# Patient Record
Sex: MALE | Race: WHITE | NOT HISPANIC OR LATINO | Employment: PART TIME | URBAN - METROPOLITAN AREA
[De-identification: names, ages, dates, MRNs, and addresses within clinical notes are randomized per-mention and may not be internally consistent; named-entity substitution may affect disease eponyms.]

---

## 2021-07-13 ENCOUNTER — OFFICE VISIT (OUTPATIENT)
Dept: INTERNAL MEDICINE | Facility: CLINIC | Age: 21
End: 2021-07-13

## 2021-07-13 VITALS
DIASTOLIC BLOOD PRESSURE: 74 MMHG | OXYGEN SATURATION: 98 % | BODY MASS INDEX: 25.97 KG/M2 | HEIGHT: 70 IN | WEIGHT: 181.4 LBS | HEART RATE: 75 BPM | TEMPERATURE: 98.2 F | SYSTOLIC BLOOD PRESSURE: 126 MMHG

## 2021-07-13 DIAGNOSIS — J45.20 MILD INTERMITTENT ASTHMA WITHOUT COMPLICATION: Primary | ICD-10-CM

## 2021-07-13 DIAGNOSIS — H92.02 EAR DISCOMFORT, LEFT: ICD-10-CM

## 2021-07-13 DIAGNOSIS — J30.2 SEASONAL ALLERGIES: ICD-10-CM

## 2021-07-13 PROCEDURE — 99204 OFFICE O/P NEW MOD 45 MIN: CPT | Performed by: FAMILY MEDICINE

## 2021-07-13 RX ORDER — MONTELUKAST SODIUM 10 MG/1
10 TABLET ORAL NIGHTLY
Qty: 90 TABLET | Refills: 1 | Status: SHIPPED | OUTPATIENT
Start: 2021-07-13 | End: 2021-09-14

## 2021-07-13 RX ORDER — LORATADINE 10 MG/1
10 TABLET ORAL DAILY
Qty: 90 TABLET | Refills: 1 | Status: SHIPPED | OUTPATIENT
Start: 2021-07-13 | End: 2021-09-14

## 2021-07-13 NOTE — PROGRESS NOTES
Subjective     Konrad Aquino is a 20 y.o. male.     Chief Complaint   Patient presents with   • Establish Care   • Earache   • Asthma   • Allergies       History of Present Illness     Lt ear discomfort for > 1 year , on/off , feels as if fluid in it , no F,C , no discharge     Asthma since childhood , using Albu PRN , weather changes makes his sx worse , c/o on/off dry cough with wheezing.    Seasonal allergies noticed sx worse with animal exposure , c/o on/off congestion , runny nose , cough , no F,C   He had bad reaction to flonase when he used it , any thing with steroid cause him bad reaction.    The following portions of the patient's history were reviewed and updated as appropriate: allergies, current medications, past family history, past medical history, past social history, past surgical history and problem list.        Review of Systems   Constitutional: Negative for activity change and appetite change.   HENT: Positive for congestion and ear pain. Negative for ear discharge.    Respiratory: Positive for cough. Negative for shortness of breath, wheezing and stridor.    All other systems reviewed and are negative.      Vitals:    07/13/21 0911   BP: 126/74   Pulse: 75   Temp: 98.2 °F (36.8 °C)   SpO2: 98%           07/13/21 0911   Weight: 82.3 kg (181 lb 6.4 oz)         Body mass index is 26.03 kg/m².      Current Outpatient Medications   Medication Sig Dispense Refill   • loratadine (Claritin) 10 MG tablet Take 1 tablet by mouth Daily. 90 tablet 1   • montelukast (Singulair) 10 MG tablet Take 1 tablet by mouth Every Night. 90 tablet 1     No current facility-administered medications for this visit.                Objective   Physical Exam  Vitals and nursing note reviewed.   Constitutional:       Appearance: He is well-developed. He is not ill-appearing or diaphoretic.   HENT:      Head: Normocephalic.      Right Ear: Tympanic membrane, ear canal and external ear normal.      Left Ear: Tympanic  membrane, ear canal and external ear normal.      Mouth/Throat:      Mouth: Mucous membranes are moist.      Pharynx: Oropharynx is clear.   Eyes:      Conjunctiva/sclera: Conjunctivae normal.   Neck:      Thyroid: No thyromegaly.   Cardiovascular:      Rate and Rhythm: Normal rate and regular rhythm.      Heart sounds: Normal heart sounds. No murmur heard.   No friction rub. No gallop.    Pulmonary:      Effort: Pulmonary effort is normal. No respiratory distress.      Breath sounds: Normal breath sounds. No stridor. No wheezing, rhonchi or rales.   Musculoskeletal:         General: Normal range of motion.      Cervical back: Neck supple.   Skin:     General: Skin is warm.      Findings: No rash.   Neurological:      Mental Status: He is alert and oriented to person, place, and time.      Motor: No abnormal muscle tone.   Psychiatric:         Mood and Affect: Mood normal.         Behavior: Behavior normal.         Thought Content: Thought content normal.         Judgment: Judgment normal.           Assessment/Plan   Diagnoses and all orders for this visit:    1. Mild intermittent asthma without complication (Primary);  - Continue Alb PRN  -     montelukast (Singulair) 10 MG tablet; Take 1 tablet by mouth Every Night.  Dispense: 90 tablet; Refill: 1  - Advised to avoid any triggers     2. Seasonal allergies  -     loratadine (Claritin) 10 MG tablet; Take 1 tablet by mouth Daily.  Dispense: 90 tablet; Refill: 1    3. Ear discomfort, left;  - Most likely due to allergies   - Close monitoring and f/u        I have fully discussed the nature of the medical condition(s) risks, complications, management, safe and proper use of medications.   I have discussed the SIDE EFFECT OF MEDICATION and importance TO report any side effect , the patient expressed good understanding.  Encouraged medication compliance and the importance of keeping scheduled follow up appointments with me and any other providers.    Patient instructed  to follow up with our office for results on any labs/imaging ordered during this visit.    Home care discussed  All questions answered  Patient verbalizes understanding and agrees to treatment plan.     Follow up: Return in about 1 month (around 8/13/2021) for follow up on current illness.

## 2021-07-13 NOTE — PATIENT INSTRUCTIONS
Allergic Rhinitis, Adult  Allergic rhinitis is a reaction to allergens. Allergens are things that can cause an allergic reaction. This condition affects the lining inside the nose (mucous membrane).  There are two types of allergic rhinitis:  · Seasonal. This type is also called hay fever. It happens only during some times of the year.  · Perennial. This type can happen at any time of the year.  This condition cannot be spread from person to person (is not contagious). It can be mild, worse, or very bad. It can develop at any age and may be outgrown.  What are the causes?  This condition may be caused by:  · Pollen from grasses, trees, and weeds.  · Dust mites.  · Smoke.  · Mold.  · Car fumes.  · The pee (urine), spit, or dander of pets. Dander is dead skin cells from a pet.  What increases the risk?  You are more likely to develop this condition if:  · You have allergies in your family.  · You have problems like allergies in your family. You may have:  ? Swelling of parts of your eyes and eyelids.  ? Asthma. This affects how you breathe.  ? Long-term redness and swelling on your skin.  ? Food allergies.  What are the signs or symptoms?  The main symptom of this condition is a runny or stuffy nose (nasal congestion). Other symptoms may include:  · Sneezing or coughing.  · Itching and tearing of your eyes.  · Mucus that drips down the back of your throat (postnasal drip).  · Trouble sleeping.  · Feeling tired.  · Headache.  · Sore throat.  How is this treated?  There is no cure for this condition. You should avoid things that you are allergic to. Treatment can help to relieve symptoms. This may include:  · Medicines that block allergy symptoms, such as corticosteroids or antihistamines. These may be given as a shot, nasal spray, or pill.  · Avoiding things you are allergic to.  · Medicines that give you bits of what you are allergic to over time. This is called immunotherapy. It is done if other treatments do not  help. You may get:  ? Shots.  ? Medicine under your tongue.  · Stronger medicines, if other treatments do not help.  Follow these instructions at home:  Avoiding allergens  Find out what things you are allergic to and avoid them. To do this, try these things:  · If you get allergies any time of year:  ? Replace carpet with wood, tile, or vinyl tru. Carpet can trap pet dander and dust.  ? Do not smoke. Do not allow smoking in your home.  ? Change your heating and air conditioning filters at least once a month.  · If you get allergies only some times of the year:  ? Keep windows closed when you can.  ? Plan things to do outside when pollen counts are lowest. Check pollen counts before you plan things to do outside.  ? When you come indoors, change your clothes and shower before you sit on furniture or bedding.  · If you are allergic to a pet:  ? Keep the pet out of your bedroom.  ? Vacuum, sweep, and dust often.    General instructions  · Take over-the-counter and prescription medicines only as told by your doctor.  · Drink enough fluid to keep your pee (urine) pale yellow.  · Keep all follow-up visits as told by your doctor. This is important.  Where to find more information  · American Academy of Allergy, Asthma & Immunology: www.aaaai.org  Contact a doctor if:  · You have a fever.  · You get a cough that does not go away.  · You make whistling sounds when you breathe (wheeze).  · Your symptoms slow you down.  · Your symptoms stop you from doing your normal things each day.  Get help right away if:  · You are short of breath.  This symptom may be an emergency. Do not wait to see if the symptom will go away. Get medical help right away. Call your local emergency services (911 in the [place] Do not drive yourself to the hospital.  Summary  · Allergic rhinitis may be treated by taking medicines and avoiding things you are allergic to.  · If you have allergies only some of the year, keep windows closed when you can  at those times.  · Contact your doctor if you get a fever or a cough that does not go away.  This information is not intended to replace advice given to you by your health care provider. Make sure you discuss any questions you have with your health care provider.  Document Revised: 02/08/2021 Document Reviewed: 12/15/2020  Elsevier Patient Education © 2021 [employer]

## 2021-09-14 ENCOUNTER — OFFICE VISIT (OUTPATIENT)
Dept: INTERNAL MEDICINE | Facility: CLINIC | Age: 21
End: 2021-09-14

## 2021-09-14 ENCOUNTER — HOSPITAL ENCOUNTER (OUTPATIENT)
Dept: GENERAL RADIOLOGY | Facility: HOSPITAL | Age: 21
Discharge: HOME | End: 2021-09-14
Admitting: FAMILY MEDICINE

## 2021-09-14 VITALS
DIASTOLIC BLOOD PRESSURE: 78 MMHG | SYSTOLIC BLOOD PRESSURE: 116 MMHG | WEIGHT: 187.4 LBS | HEART RATE: 73 BPM | TEMPERATURE: 97.5 F | BODY MASS INDEX: 26.83 KG/M2 | HEIGHT: 70 IN | OXYGEN SATURATION: 98 %

## 2021-09-14 DIAGNOSIS — M25.571 CHRONIC PAIN OF RIGHT ANKLE: ICD-10-CM

## 2021-09-14 DIAGNOSIS — G89.29 CHRONIC PAIN OF RIGHT ANKLE: ICD-10-CM

## 2021-09-14 DIAGNOSIS — J45.20 MILD INTERMITTENT ASTHMA WITHOUT COMPLICATION: Primary | ICD-10-CM

## 2021-09-14 DIAGNOSIS — J30.2 SEASONAL ALLERGIES: ICD-10-CM

## 2021-09-14 DIAGNOSIS — L98.9 SKIN LESION: ICD-10-CM

## 2021-09-14 PROCEDURE — 99213 OFFICE O/P EST LOW 20 MIN: CPT | Performed by: FAMILY MEDICINE

## 2021-09-14 PROCEDURE — 73610 X-RAY EXAM OF ANKLE: CPT

## 2021-09-14 RX ORDER — ALBUTEROL SULFATE 90 UG/1
2 AEROSOL, METERED RESPIRATORY (INHALATION) EVERY 4 HOURS PRN
Qty: 18 G | Refills: 3 | Status: SHIPPED | OUTPATIENT
Start: 2021-09-14 | End: 2022-10-03 | Stop reason: SDUPTHER

## 2021-09-14 NOTE — PROGRESS NOTES
Subjective     Konrad Aquino is a 21 y.o. male.     Chief Complaint   Patient presents with   • Allergies     f/u   • Asthma     stated he needs and inhaler for when symptoms occur    • Ankle Pain   • Rash       History of Present Illness     Seasonal allergies ; sx improved with current Allergy medication specially when he is going out side. Dose not like Singulair SO HE STOPPED TAKING IT     Asthma;No new flare up or new visit to ER, using Albu as needed.  Sx worse when he is around strong spray ,dust makes his sx worse     Rt ankle pain for > 1 year , pain constant mainly over the medial malleolus , 5/10 , worse after long standing hrs. No h/o fall or trauma     Skin lesion on the RT shoulder for > 6 months , gets smaller , not itchy , was red, changed to dark color for last 4 months     The following portions of the patient's history were reviewed and updated as appropriate: allergies, current medications, past family history, past medical history, past social history, past surgical history and problem list.        Review of Systems   Musculoskeletal: Positive for arthralgias.   Skin: Positive for color change and skin lesions.       Vitals:    09/14/21 0930   BP: 116/78   Pulse: 73   Temp: 97.5 °F (36.4 °C)   SpO2: 98%           09/14/21 0930   Weight: 85 kg (187 lb 6.4 oz)         Body mass index is 26.89 kg/m².      Current Outpatient Medications   Medication Sig Dispense Refill   • albuterol sulfate  (90 Base) MCG/ACT inhaler Inhale 2 puffs Every 4 (Four) Hours As Needed for Wheezing. 18 g 3     No current facility-administered medications for this visit.                Objective   Physical Exam  Vitals and nursing note reviewed.   Constitutional:       Appearance: He is not ill-appearing or diaphoretic.   HENT:      Mouth/Throat:      Mouth: Mucous membranes are moist.      Pharynx: Oropharynx is clear.   Cardiovascular:      Rate and Rhythm: Normal rate and regular rhythm.      Heart sounds:  Normal heart sounds. No murmur heard.   No friction rub. No gallop.    Pulmonary:      Effort: Pulmonary effort is normal. No respiratory distress.      Breath sounds: Normal breath sounds. No stridor. No wheezing or rhonchi.   Musculoskeletal:         General: No swelling, tenderness or deformity. Normal range of motion.      Cervical back: Neck supple.   Skin:     General: Skin is warm.      Findings: Lesion present.      Comments: Small brown papules over the Rt shoulder    Neurological:      Mental Status: He is alert and oriented to person, place, and time.   Psychiatric:         Mood and Affect: Mood normal.         Behavior: Behavior normal.         Thought Content: Thought content normal.           Assessment/Plan   Diagnoses and all orders for this visit:    1. Mild intermittent asthma without complication (Primary);  - Well controlled, continue Rx  -     albuterol sulfate  (90 Base) MCG/ACT inhaler; Inhale 2 puffs Every 4 (Four) Hours As Needed for Wheezing.  Dispense: 18 g; Refill: 3    2. Seasonal allergies  - Well controlled, continue Rx PRN    3. Chronic pain of right ankle;  - To r/o stress # or any other path  -     XR Ankle 3+ View Right; Future    4. Skin lesion;  - Close monitoring and f/u          I have fully discussed the nature of the medical condition(s) risks, complications, management, safe and proper use of medications.   I have discussed the SIDE EFFECT OF MEDICATION and importance TO report any side effect , the patient expressed good understanding.  Encouraged medication compliance and the importance of keeping scheduled follow up appointments with me and any other providers.    Patient instructed to follow up with our office for results on any labs/imaging ordered during this visit.    Home care discussed  All questions answered  Patient verbalizes understanding and agrees to treatment plan.     Follow up: Return in about 3 months (around 12/14/2021) for follow up on current  illness.

## 2021-09-14 NOTE — PATIENT INSTRUCTIONS
Asthma Action Plan, Adult  An asthma action plan helps you understand how to manage your asthma and what to do when you have an asthma attack. The action plan is a color-coded plan that lists the symptoms that indicate whether or not your condition is under control and what actions to take.  · If you have symptoms in the green zone, it means you are doing well.  · If you have symptoms in the yellow zone, it means you are having problems.  · If you have symptoms in the red zone, you need medical care right away.  Follow the plan that you and your health care provider develop. Review your plan with your health care provider at each visit.  What triggers your asthma?  Knowing the things that can trigger an asthma attack or make your asthma symptoms worse is very important. Talk to your health care provider about your asthma triggers and how to avoid them. Record your known asthma triggers here: _______________  What is your personal best peak flow reading?  If you use a peak flow meter, determine your personal best reading. Record it here: _______________  Red zone  Symptoms in this zone mean that you should get medical help right away. You will likely feel distressed and have symptoms at rest that restrict your activity. You are in the red zone if:  · You are breathing hard and quickly.  · Your nose opens wide, your ribs show, and your neck muscles become visible when you breathe in.  · Your lips, fingers, or toes are a bluish color.  · You have trouble speaking in full sentences.  · Your peak flow reading is less than __________ (less than 50% of your personal best).  · Your symptoms do not improve within 15-20 minutes after you use your reliever or rescue medicine (bronchodilator).  If you have any of these symptoms:  · Call your local emergency services (911 in the [place] or go to the nearest emergency room.  · Use your reliever or rescue medicine.  ? Start a nebulizer treatment or take 2-4 puffs from a metered-dose  inhaler with a spacer.  ? Repeat this action every 15-20 minutes until help arrives.  Yellow zone  Symptoms in this zone mean that your condition may be getting worse. You may have symptoms that interfere with exercise, are noticeably worse after exposure to triggers, or are worse at the first sign of a cold (upper respiratory infection). These may include:  · Waking from sleep.  · Coughing, especially at night or first thing in the morning.  · Mild wheezing.  · Chest tightness.  · A peak flow reading that is __________ to __________ (50-79% of your personal best).  If you have any of these symptoms:  · Add the following medicine to the ones that you use daily:  ? Reliever or rescue medicine and dosage: _______________  ? Additional medicine and dosage: _______________  Call your health care provider if:  · You remain in the yellow zone for __________ hours.  · You are using a reliever or rescue medicine more than 2-3 times a week.  Green zone  This zone means that your asthma is under control. You may not have any symptoms while you are in the green zone. This means that you:  · Have no coughing or wheezing, even while you are working or playing.  · Sleep through the night.  · Are breathing well.  · Have a peak flow reading that is above __________ (80% of your personal best or greater).  If you are in the green zone, continue to manage your asthma as directed:  · Take these medicines every day:  ? Controller medicine and dosage: _______________  ? Controller medicine and dosage: _______________  ? Controller medicine and dosage: _______________  ? Controller medicine and dosage: _______________  · Before exercise, use this reliever or rescue medicine: _______________  Call your health care provider if you are using a reliever or rescue medicine more than 2-3 times a week.  Where to find more information  You can find more information about asthma from:  · Centers for Disease Control and Prevention:  www.cdc.gov/asthma  · American Lung Association: www.lung.org  This information is not intended to replace advice given to you by your health care provider. Make sure you discuss any questions you have with your health care provider.  Document Revised: 04/13/2021 Document Reviewed: 04/13/2021  [employer] Patient Education © 2021 [employer]

## 2021-09-17 ENCOUNTER — TELEPHONE (OUTPATIENT)
Dept: INTERNAL MEDICINE | Facility: CLINIC | Age: 21
End: 2021-09-17

## 2021-09-17 NOTE — TELEPHONE ENCOUNTER
----- Message from Scotty Richey MD sent at 9/16/2021  5:24 PM EDT -----  PLEASE call for normal XR results

## 2021-11-19 PROCEDURE — U0004 COV-19 TEST NON-CDC HGH THRU: HCPCS | Performed by: FAMILY MEDICINE

## 2022-01-11 NOTE — TELEPHONE ENCOUNTER
Rx Refill Note  Requested Prescriptions      No prescriptions requested or ordered in this encounter      Last office visit with prescribing clinician: 9/14/2021      Next office visit with prescribing clinician: Visit date not found     Yaquelin Shen MA  01/11/22, 11:04 EST     Pt requested refill for montelukast but prescription was discontinued 9/14/21.

## 2022-01-27 ENCOUNTER — OFFICE VISIT (OUTPATIENT)
Dept: INTERNAL MEDICINE | Facility: CLINIC | Age: 22
End: 2022-01-27

## 2022-01-27 VITALS
OXYGEN SATURATION: 96 % | HEART RATE: 80 BPM | SYSTOLIC BLOOD PRESSURE: 110 MMHG | DIASTOLIC BLOOD PRESSURE: 72 MMHG | HEIGHT: 71 IN | WEIGHT: 198.6 LBS | TEMPERATURE: 96.9 F | BODY MASS INDEX: 27.8 KG/M2

## 2022-01-27 DIAGNOSIS — J30.2 SEASONAL ALLERGIES: ICD-10-CM

## 2022-01-27 DIAGNOSIS — R21 RASH: Primary | ICD-10-CM

## 2022-01-27 DIAGNOSIS — R20.0 NUMBNESS AND TINGLING OF BOTH FEET: ICD-10-CM

## 2022-01-27 DIAGNOSIS — R20.2 NUMBNESS AND TINGLING OF BOTH FEET: ICD-10-CM

## 2022-01-27 PROCEDURE — 99214 OFFICE O/P EST MOD 30 MIN: CPT | Performed by: FAMILY MEDICINE

## 2022-01-27 NOTE — PROGRESS NOTES
Subjective     Konrad Aquino is a 21 y.o. male.     Chief Complaint   Patient presents with   • Rash   • Numbness     FOOT NUMBNESS        History of Present Illness     C/o Rash for > 2 months at bot butt area ,  on/off, itchy, some times gets blisters , it last for 2 weeks then resolved by itself. No new detergent/chemicals or others. He is concerning for shingle   He is wearied feeling when he takes Flonase or steroid INH but ok with topical creams    Also c/o Numbness of both feet/sole for > 1 year , constant , he dose not feel that area much, he has Flat feet . Used OTC topicals with no better.    Seasonal allergies ; sx on /off with runny nose, sneezing , sx well controlled with current Rx    The following portions of the patient's history were reviewed and updated as appropriate: allergies, current medications, past family history, past medical history, past social history, past surgical history and problem list.        Review of Systems   Cardiovascular: Negative for chest pain, palpitations and leg swelling.   Skin: Positive for rash.   Neurological: Positive for numbness.       Vitals:    01/27/22 0924   BP: 110/72   Pulse: 80   Temp: 96.9 °F (36.1 °C)   SpO2: 96%           01/27/22 0924   Weight: 90.1 kg (198 lb 9.6 oz)         Body mass index is 27.71 kg/m².      Current Outpatient Medications   Medication Sig Dispense Refill   • albuterol sulfate  (90 Base) MCG/ACT inhaler Inhale 2 puffs Every 4 (Four) Hours As Needed for Wheezing. 18 g 3   • loratadine (CLARITIN) 10 MG tablet Take 10 mg by mouth Daily.     • ondansetron (ZOFRAN) 4 MG tablet Take 1 tablet by mouth Every 8 (Eight) Hours As Needed for Nausea. 9 tablet 0   • triamcinolone (KENALOG) 0.1 % ointment Apply 1 application topically to the appropriate area as directed 2 (Two) Times a Day. 30 g 2     No current facility-administered medications for this visit.                Objective   Physical Exam  Vitals and nursing note reviewed.    Constitutional:       General: He is not in acute distress.     Appearance: He is not ill-appearing, toxic-appearing or diaphoretic.   HENT:      Mouth/Throat:      Mouth: Mucous membranes are moist.   Cardiovascular:      Rate and Rhythm: Normal rate and regular rhythm.      Heart sounds: Normal heart sounds. No murmur heard.      Pulmonary:      Effort: Pulmonary effort is normal. No respiratory distress.      Breath sounds: Normal breath sounds. No stridor. No wheezing or rhonchi.   Musculoskeletal:         General: Normal range of motion.   Skin:     General: Skin is dry.      Findings: Rash present.      Comments: Mild irritative rash over the top of both butt area    Neurological:      Mental Status: He is alert and oriented to person, place, and time.      Comments: diminish sensation on both feet at the mid of the sole    Psychiatric:         Mood and Affect: Mood normal.         Behavior: Behavior normal.         Thought Content: Thought content normal.           Assessment/Plan   Diagnoses and all orders for this visit:    1. Rash (Primary);  - Likely contact dermitis , will try;  -     triamcinolone (KENALOG) 0.1 % ointment; Apply 1 application topically to the appropriate area as directed 2 (Two) Times a Day.  Dispense: 30 g; Refill: 2  - Advised to avoid any new chemicals/creams/detregent     2. Numbness and tingling of both feet;  -     Ambulatory Referral to Podiatry    3. Seasonal allergies  - Stable, continue current Rx     I was wearing N95 mask and eye protection during the entire duration of the visit.   Patient was masked the whole entire time.   Minimum social distance of 6 ft maintained through entire visit except for physical exam as documented.          I have fully discussed the nature of the medical condition(s) risks, complications, management, safe and proper use of medications.   I have discussed the SIDE EFFECT OF MEDICATION and importance TO report any side effect , the patient  expressed good understanding.  Encouraged medication compliance and the importance of keeping scheduled follow up appointments with me and any other providers.    Patient instructed to follow up with our office for results on any labs/imaging ordered during this visit.    Home care discussed  All questions answered  Patient verbalizes understanding and agrees to treatment plan.     Follow up: Return in about 8 weeks (around 3/24/2022) for physical, blood work.

## 2022-02-15 DIAGNOSIS — R20.0 NUMBNESS AND TINGLING OF BOTH FEET: Primary | ICD-10-CM

## 2022-02-15 DIAGNOSIS — R20.2 NUMBNESS AND TINGLING OF BOTH FEET: Primary | ICD-10-CM

## 2022-04-07 ENCOUNTER — LAB (OUTPATIENT)
Dept: LAB | Facility: HOSPITAL | Age: 22
End: 2022-04-07

## 2022-04-07 ENCOUNTER — OFFICE VISIT (OUTPATIENT)
Dept: INTERNAL MEDICINE | Facility: CLINIC | Age: 22
End: 2022-04-07

## 2022-04-07 VITALS
SYSTOLIC BLOOD PRESSURE: 122 MMHG | BODY MASS INDEX: 27.89 KG/M2 | DIASTOLIC BLOOD PRESSURE: 80 MMHG | HEART RATE: 92 BPM | OXYGEN SATURATION: 97 % | HEIGHT: 71 IN | TEMPERATURE: 97.1 F | WEIGHT: 199.2 LBS

## 2022-04-07 DIAGNOSIS — Z23 NEED FOR PROPHYLACTIC VACCINATION AGAINST STREPTOCOCCUS PNEUMONIAE (PNEUMOCOCCUS): ICD-10-CM

## 2022-04-07 DIAGNOSIS — Z00.00 ENCOUNTER FOR ANNUAL PHYSICAL EXAM: Primary | ICD-10-CM

## 2022-04-07 DIAGNOSIS — J45.20 MILD INTERMITTENT ASTHMA WITHOUT COMPLICATION: ICD-10-CM

## 2022-04-07 DIAGNOSIS — Z00.00 ENCOUNTER FOR ANNUAL PHYSICAL EXAM: ICD-10-CM

## 2022-04-07 LAB
ALBUMIN SERPL-MCNC: 5.2 G/DL (ref 3.5–5.2)
ALBUMIN/GLOB SERPL: 2.2 G/DL
ALP SERPL-CCNC: 123 U/L (ref 39–117)
ALT SERPL W P-5'-P-CCNC: 26 U/L (ref 1–41)
ANION GAP SERPL CALCULATED.3IONS-SCNC: 8.8 MMOL/L (ref 5–15)
AST SERPL-CCNC: 22 U/L (ref 1–40)
BILIRUB BLD-MCNC: NEGATIVE MG/DL
BILIRUB SERPL-MCNC: 0.8 MG/DL (ref 0–1.2)
BUN SERPL-MCNC: 12 MG/DL (ref 6–20)
BUN/CREAT SERPL: 17.9 (ref 7–25)
CALCIUM SPEC-SCNC: 9.7 MG/DL (ref 8.6–10.5)
CHLORIDE SERPL-SCNC: 104 MMOL/L (ref 98–107)
CLARITY, POC: CLEAR
CO2 SERPL-SCNC: 26.2 MMOL/L (ref 22–29)
COLOR UR: ABNORMAL
CREAT SERPL-MCNC: 0.67 MG/DL (ref 0.76–1.27)
DEPRECATED RDW RBC AUTO: 39.9 FL (ref 37–54)
EGFRCR SERPLBLD CKD-EPI 2021: 135.4 ML/MIN/1.73
ERYTHROCYTE [DISTWIDTH] IN BLOOD BY AUTOMATED COUNT: 12.9 % (ref 12.3–15.4)
EXPIRATION DATE: ABNORMAL
GLOBULIN UR ELPH-MCNC: 2.4 GM/DL
GLUCOSE SERPL-MCNC: 95 MG/DL (ref 65–99)
GLUCOSE UR STRIP-MCNC: NEGATIVE MG/DL
HCT VFR BLD AUTO: 47 % (ref 37.5–51)
HGB BLD-MCNC: 15.7 G/DL (ref 13–17.7)
KETONES UR QL: NEGATIVE
LEUKOCYTE EST, POC: NEGATIVE
Lab: ABNORMAL
MCH RBC QN AUTO: 28.9 PG (ref 26.6–33)
MCHC RBC AUTO-ENTMCNC: 33.4 G/DL (ref 31.5–35.7)
MCV RBC AUTO: 86.4 FL (ref 79–97)
NITRITE UR-MCNC: NEGATIVE MG/ML
PH UR: 6.5 [PH] (ref 5–8)
PLATELET # BLD AUTO: 190 10*3/MM3 (ref 140–450)
PMV BLD AUTO: 11.7 FL (ref 6–12)
POTASSIUM SERPL-SCNC: 4 MMOL/L (ref 3.5–5.2)
PROT SERPL-MCNC: 7.6 G/DL (ref 6–8.5)
PROT UR STRIP-MCNC: ABNORMAL MG/DL
RBC # BLD AUTO: 5.44 10*6/MM3 (ref 4.14–5.8)
RBC # UR STRIP: NEGATIVE /UL
SODIUM SERPL-SCNC: 139 MMOL/L (ref 136–145)
SP GR UR: 1.02 (ref 1–1.03)
UROBILINOGEN UR QL: NORMAL
WBC NRBC COR # BLD: 5.9 10*3/MM3 (ref 3.4–10.8)

## 2022-04-07 PROCEDURE — 80053 COMPREHEN METABOLIC PANEL: CPT | Performed by: FAMILY MEDICINE

## 2022-04-07 PROCEDURE — 90732 PPSV23 VACC 2 YRS+ SUBQ/IM: CPT | Performed by: FAMILY MEDICINE

## 2022-04-07 PROCEDURE — 3008F BODY MASS INDEX DOCD: CPT | Performed by: FAMILY MEDICINE

## 2022-04-07 PROCEDURE — 85027 COMPLETE CBC AUTOMATED: CPT | Performed by: FAMILY MEDICINE

## 2022-04-07 PROCEDURE — 2014F MENTAL STATUS ASSESS: CPT | Performed by: FAMILY MEDICINE

## 2022-04-07 PROCEDURE — 81003 URINALYSIS AUTO W/O SCOPE: CPT | Performed by: FAMILY MEDICINE

## 2022-04-07 PROCEDURE — 99395 PREV VISIT EST AGE 18-39: CPT | Performed by: FAMILY MEDICINE

## 2022-04-07 PROCEDURE — 90471 IMMUNIZATION ADMIN: CPT | Performed by: FAMILY MEDICINE

## 2022-04-07 RX ORDER — LORATADINE 10 MG
TABLET ORAL
COMMUNITY
Start: 2022-03-22 | End: 2022-10-03

## 2022-04-07 RX ORDER — KETOCONAZOLE 20 MG/ML
SHAMPOO TOPICAL
COMMUNITY
Start: 2022-03-24 | End: 2022-12-13

## 2022-04-07 RX ORDER — OLOPATADINE HYDROCHLORIDE 1 MG/ML
SOLUTION/ DROPS OPHTHALMIC
COMMUNITY
Start: 2022-03-22

## 2022-04-07 NOTE — PROGRESS NOTES
Patient Care Team:  Scotty Richey MD as PCP - General (Family Medicine)     Chief complaint: Patient is in today for a physical          Patient is a 21 y.o. male who presents for his yearly physical exam.     HPI      Doing well   Eats RD, walks ~ 2 mile /day  Asthma and SA; He saw allergist , allergy test done which showed that he is allergic to many things, started on shots, doing well on current Rx    In 2014 , had Tdap     Health maintenance/lifestyle:  Immunization History   Administered Date(s) Administered   • COVID-19 (PFIZER) PURPLE CAP 03/18/2021, 04/13/2021, 11/23/2021         PHQ-2 Depression Screening  Little interest or pleasure in doing things?  0   Feeling down, depressed, or hopeless?  0   PHQ-2 Total Score  0     Social History     Tobacco Use   Smoking Status Never Smoker   Smokeless Tobacco Never Used     Social History     Substance and Sexual Activity   Alcohol Use Yes   • Alcohol/week: 2.0 standard drinks   • Types: 2 Cans of beer per week         Review of Systems   Constitutional: Negative for activity change and appetite change.   Respiratory: Positive for cough.    Cardiovascular: Negative for chest pain, palpitations and leg swelling.   All other systems reviewed and are negative.        History  Past Medical History:   Diagnosis Date   • Acid reflux    • Asthma       Past Surgical History:   Procedure Laterality Date   • HAND SURGERY     • WISDOM TOOTH EXTRACTION  01/2022      Allergies   Allergen Reactions   • Flonase [Fluticasone] Other (See Comments)     Panic attacks   • Latex Rash   • Penicillins Rash      Family History   Problem Relation Age of Onset   • ADD / ADHD Mother    • Stroke Maternal Grandfather      Social History     Socioeconomic History   • Marital status: Single   Tobacco Use   • Smoking status: Never Smoker   • Smokeless tobacco: Never Used   Vaping Use   • Vaping Use: Never used   Substance and Sexual Activity   • Alcohol use: Yes     Alcohol/week: 2.0  "standard drinks     Types: 2 Cans of beer per week   • Drug use: Defer   • Sexual activity: Defer        Current Outpatient Medications:   •  Advair Diskus 250-50 MCG/DOSE DISKUS, Inhale 1 puff 2 (Two) Times a Day., Disp: , Rfl:   •  albuterol sulfate  (90 Base) MCG/ACT inhaler, Inhale 2 puffs Every 4 (Four) Hours As Needed for Wheezing., Disp: 18 g, Rfl: 3  •  ketoconazole (NIZORAL) 2 % shampoo, , Disp: , Rfl:   •  olopatadine (PATANOL) 0.1 % ophthalmic solution, INSTILL 1 DROP INTO AFFECTED EYES TWICE DAILY AS NEEDED FOR ALLERGIES, Disp: , Rfl:   •  ondansetron (ZOFRAN) 4 MG tablet, Take 1 tablet by mouth Every 8 (Eight) Hours As Needed for Nausea., Disp: 9 tablet, Rfl: 0  •  Wal-itin D 24 Hour  MG per 24 hr tablet, TAKE 1 TABLET BY MOUTH IN THE MORNING AS NEEDED FOR ALLERGIES, Disp: , Rfl:   •  loratadine (CLARITIN) 10 MG tablet, Take 10 mg by mouth Daily., Disp: , Rfl:                   /80   Pulse 92   Temp 97.1 °F (36.2 °C) (Temporal)   Ht 180.3 cm (70.98\")   Wt 90.4 kg (199 lb 3.2 oz)   SpO2 97%   BMI 27.80 kg/m²       Physical Exam  Vitals and nursing note reviewed.   Constitutional:       General: He is not in acute distress.     Appearance: Normal appearance. He is well-developed. He is not ill-appearing, toxic-appearing or diaphoretic.   HENT:      Head: Normocephalic.      Right Ear: Tympanic membrane, ear canal and external ear normal.      Left Ear: Tympanic membrane, ear canal and external ear normal.      Nose: Nose normal. No congestion or rhinorrhea.      Mouth/Throat:      Mouth: Mucous membranes are moist.      Pharynx: Oropharynx is clear. No oropharyngeal exudate or posterior oropharyngeal erythema.   Eyes:      General:         Right eye: No discharge.         Left eye: No discharge.      Extraocular Movements: Extraocular movements intact.      Conjunctiva/sclera: Conjunctivae normal.      Pupils: Pupils are equal, round, and reactive to light.   Neck:      Thyroid: " No thyromegaly.      Comments: No enlarged thyroid    Cardiovascular:      Rate and Rhythm: Normal rate and regular rhythm.      Heart sounds: Normal heart sounds. No murmur heard.    No friction rub. No gallop.   Pulmonary:      Effort: Pulmonary effort is normal. No respiratory distress.      Breath sounds: Normal breath sounds. No stridor. No wheezing, rhonchi or rales.   Abdominal:      General: Bowel sounds are normal. There is no distension.      Palpations: Abdomen is soft. There is no mass.      Tenderness: There is no abdominal tenderness. There is no guarding or rebound.      Hernia: No hernia is present.   Musculoskeletal:         General: Normal range of motion.      Cervical back: Neck supple.   Skin:     General: Skin is warm.      Findings: No bruising, erythema, lesion or rash.   Neurological:      General: No focal deficit present.      Mental Status: He is alert and oriented to person, place, and time.      Cranial Nerves: No cranial nerve deficit.      Sensory: No sensory deficit.      Motor: No weakness or abnormal muscle tone.      Coordination: Coordination normal.      Gait: Gait normal.      Deep Tendon Reflexes: Reflexes normal.   Psychiatric:         Mood and Affect: Mood normal.         Behavior: Behavior normal.         Thought Content: Thought content normal.         Judgment: Judgment normal.                   Diagnoses and all orders for this visit:    1. Encounter for annual physical exam (Primary)  -     POCT urinalysis dipstick, automated  -     CBC (No Diff); Future  -     Comprehensive Metabolic Panel; Future    2. Need for prophylactic vaccination against Streptococcus pneumoniae (pneumococcus)  -     Pneumococcal Polysaccharide Vaccine 23-Valent Greater Than or Equal To 1yo Subcutaneous / IM    3. Mild intermittent asthma without complication;'-   - Stable, continue current Rx      Lifestyle Counseling:  · Lifestyle Modifications: Continue good lifestyle choices/modifications,  Follow a low fat, low cholesterol diet, Reduce exposure to stress if possible and Discussed sexual issues, safe sex practices  · Safety Issues: Always wear seatbelt, Avoid texting while driving   · Use sunscreen, regular skin examination  · Recommended annual dental/vision examination.  · Emotional/Stress/Sleep: Reviewed and  given when appropriate    Follow up: Return in about 1 year (around 4/10/2023) for physical.  Plan of care discussed with pt. They verbalized understanding and agreement.     Scotty Richey MD   4/7/2022   14:14 EDT

## 2022-04-11 ENCOUNTER — TELEPHONE (OUTPATIENT)
Dept: INTERNAL MEDICINE | Facility: CLINIC | Age: 22
End: 2022-04-11

## 2022-04-11 NOTE — TELEPHONE ENCOUNTER
----- Message from Scotty Richey MD sent at 4/9/2022 11:49 AM EDT -----  PLEASE call for normal blood results except for mild elevation in one of the liver enzymes , will closely monitor it.

## 2022-04-11 NOTE — TELEPHONE ENCOUNTER
Caller: Zuhair Aquino    Relationship: Self    Best call back number:     What is the best time to reach you: ANYTIME PREFERABLY AFTER 3P    Who are you requesting to speak with (clinical staff, provider,  specific staff member): CLINICAL STAFF    Do you know the name of the person who called: ZUHAIR    What was the call regarding: PATIENT IS CONCERNED ABOUT HIS LIVER ENZYMES BEING ELEVATED    Do you require a callback: YES

## 2022-04-11 NOTE — TELEPHONE ENCOUNTER
Mildly elevated alkaline phosphatase which is one of the liver enzymes. Reason for elevation not known yet as it needs further work up. My suggestion is to recheck it again in 3 months and will go from there

## 2022-04-12 ENCOUNTER — TELEPHONE (OUTPATIENT)
Dept: INTERNAL MEDICINE | Facility: CLINIC | Age: 22
End: 2022-04-12

## 2022-04-12 NOTE — TELEPHONE ENCOUNTER
Called pt and lft  vm with cb number to inform of     Mildly elevated alkaline phosphatase which is one of the liver enzymes. Reason for elevation not known yet as it needs further work up. My suggestion is to recheck it again in 3 months and will go from there

## 2022-04-12 NOTE — TELEPHONE ENCOUNTER
Caller: Konrad Aquino    Relationship: Self    Best call back number: [phone]    What is the best time to reach you: ANYTIME    Who are you requesting to speak with (clinical staff, provider,  specific staff member): CLINICAL STAFF    Do you know the name of the person who called: NELLY    What was the call regarding: RESULTS

## 2022-04-13 NOTE — TELEPHONE ENCOUNTER
Called and lvm for patient to return call, office number given.     Hub: Please relay providers message: Mildly elevated alkaline phosphatase which is one of the liver enzymes. Reason for elevation not known yet as it needs further work up. My suggestion is to recheck it again in 3 months and will go from there

## 2022-04-14 NOTE — TELEPHONE ENCOUNTER
2nd attempt   Called and lvm for patient to return call, office number given.      Hub: Please relay providers message: Mildly elevated alkaline phosphatase which is one of the liver enzymes. Reason for elevation not known yet as it needs further work up. My suggestion is to recheck it again in 3 months and will go from there

## 2022-05-05 ENCOUNTER — OFFICE VISIT (OUTPATIENT)
Dept: NEUROLOGY | Facility: CLINIC | Age: 22
End: 2022-05-05

## 2022-05-05 ENCOUNTER — LAB (OUTPATIENT)
Dept: LAB | Facility: HOSPITAL | Age: 22
End: 2022-05-05

## 2022-05-05 VITALS
HEART RATE: 88 BPM | OXYGEN SATURATION: 97 % | HEIGHT: 71 IN | SYSTOLIC BLOOD PRESSURE: 120 MMHG | TEMPERATURE: 96.9 F | DIASTOLIC BLOOD PRESSURE: 78 MMHG | WEIGHT: 198 LBS | BODY MASS INDEX: 27.72 KG/M2

## 2022-05-05 DIAGNOSIS — R20.2 PARESTHESIA: ICD-10-CM

## 2022-05-05 DIAGNOSIS — R20.2 PARESTHESIA: Primary | ICD-10-CM

## 2022-05-05 PROBLEM — G43.909 MIGRAINE: Status: ACTIVE | Noted: 2022-05-05

## 2022-05-05 PROBLEM — J45.909 ASTHMA: Status: ACTIVE | Noted: 2022-05-05

## 2022-05-05 PROBLEM — K21.9 ACID REFLUX: Status: ACTIVE | Noted: 2022-05-05

## 2022-05-05 PROBLEM — R26.2 DIFFICULTY WALKING: Status: ACTIVE | Noted: 2022-05-05

## 2022-05-05 LAB
CK SERPL-CCNC: 94 U/L (ref 20–200)
CRP SERPL-MCNC: <0.3 MG/DL (ref 0–0.5)
FOLATE SERPL-MCNC: 16.1 NG/ML (ref 4.78–24.2)
TSH SERPL DL<=0.05 MIU/L-ACNC: 1.65 UIU/ML (ref 0.27–4.2)
VIT B12 BLD-MCNC: 455 PG/ML (ref 211–946)

## 2022-05-05 PROCEDURE — 82550 ASSAY OF CK (CPK): CPT

## 2022-05-05 PROCEDURE — 82746 ASSAY OF FOLIC ACID SERUM: CPT

## 2022-05-05 PROCEDURE — 86038 ANTINUCLEAR ANTIBODIES: CPT

## 2022-05-05 PROCEDURE — 84443 ASSAY THYROID STIM HORMONE: CPT

## 2022-05-05 PROCEDURE — 82607 VITAMIN B-12: CPT

## 2022-05-05 PROCEDURE — 86140 C-REACTIVE PROTEIN: CPT

## 2022-05-05 PROCEDURE — 36415 COLL VENOUS BLD VENIPUNCTURE: CPT

## 2022-05-05 PROCEDURE — 99204 OFFICE O/P NEW MOD 45 MIN: CPT | Performed by: NURSE PRACTITIONER

## 2022-05-05 RX ORDER — MONTELUKAST SODIUM 10 MG/1
10 TABLET ORAL NIGHTLY
COMMUNITY
End: 2022-10-03

## 2022-05-05 NOTE — PROGRESS NOTES
Neuro Office Visit      Encounter Date: 2022   Patient Name: Konrad Aquino  : 2000   MRN: [id number]   PCP: Dr Scotty Richey  Chief Complaint:    Chief Complaint   Patient presents with   • Numbness and Tingling in feet       History of Present Illness: Konrad Aquino is a 21 y.o. male who is here today in Neurology for numbness.    Paresthesia  Onset  plantar aspect of bilat feet at ant aspect of heel. Foot feels asleep. Has decreased sensation. No pins or needles. It is not painful. Constant 24/7 but he is able to sleep. No altered gait or freq falls. Feels his balance is off. Seeing a podiatrist-no known diagnosis. Will be placed in air cast next week for right foot pain.  Right ankle is painful around achilles. Applying pressure will cause tingling and pressure. Foot care has centralized his symptoms. Symptoms improved w Tylenol.    Denies confusion or memory loss, loss of vision, slurred speech, dysphagia, numbness in other places. No falls. Does drop items.    No known history of DM  Has a regular diet with one or meals per day w snack. Will drink 1 beer per week. No recreational drugs. No supplements.  No known back or neck injuries. Occasional back strain.      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  Subjective      Past Medical History:   Past Medical History:   Diagnosis Date   • Acid reflux    • Asthma    • Difficulty walking 2010    Been a probelm since i was a child   • Migraine 2010    Iv had migraines since childhood occasionly       Past Surgical History:   Past Surgical History:   Procedure Laterality Date   • HAND SURGERY     • WISDOM TOOTH EXTRACTION  2022       Family History:   Family History   Problem Relation Age of Onset   • ADD / ADHD Mother    • Stroke Maternal Grandfather        Social History:   Social History     Socioeconomic History   • Marital status: Single   Tobacco Use   • Smoking status: Former  Smoker     Packs/day: 0.00     Years: 1.00     Pack years: 0.00     Types: Cigarettes     Start date: 2019     Quit date: 9/10/2020     Years since quittin.6   • Smokeless tobacco: Never Used   • Tobacco comment: Mostly vaped but did smoke some cigarettes   Vaping Use   • Vaping Use: Former   • Substances: Nicotine   • Devices: RefZipsceneble tank   Substance and Sexual Activity   • Alcohol use: Yes     Alcohol/week: 2.0 standard drinks     Types: 2 Cans of beer per week     Comment: 2 or less   • Drug use: Never   • Sexual activity: Yes     Partners: Female     Birth control/protection: Condom       Medications:     Current Outpatient Medications:   •  Advair Diskus 250-50 MCG/DOSE DISKUS, Inhale 1 puff 2 (Two) Times a Day., Disp: , Rfl:   •  albuterol sulfate  (90 Base) MCG/ACT inhaler, Inhale 2 puffs Every 4 (Four) Hours As Needed for Wheezing., Disp: 18 g, Rfl: 3  •  ketoconazole (NIZORAL) 2 % shampoo, , Disp: , Rfl:   •  montelukast (SINGULAIR) 10 MG tablet, Take 10 mg by mouth Every Night., Disp: , Rfl:   •  olopatadine (PATANOL) 0.1 % ophthalmic solution, INSTILL 1 DROP INTO AFFECTED EYES TWICE DAILY AS NEEDED FOR ALLERGIES, Disp: , Rfl:   •  Wal-itin D 24 Hour  MG per 24 hr tablet, TAKE 1 TABLET BY MOUTH IN THE MORNING AS NEEDED FOR ALLERGIES, Disp: , Rfl:     Allergies:   Allergies   Allergen Reactions   • Flonase [Fluticasone] Other (See Comments)     Panic attacks   • Latex Rash   • Penicillins Rash       PHQ-9 Total Score:     STEADI Fall Risk Assessment was completed, and patient is at LOW risk for falls.Assessment completed on:2022    Objective     Physical Exam:   Physical Exam  Eyes:      Pupils: Pupils are equal, round, and reactive to light.   Neurological:      Mental Status: He is oriented to person, place, and time.      Gait: Gait is intact.      Deep Tendon Reflexes:      Reflex Scores:       Tricep reflexes are 2+ on the right side and 2+ on the left side.       Bicep  "reflexes are 2+ on the right side and 2+ on the left side.       Brachioradialis reflexes are 2+ on the right side and 2+ on the left side.       Patellar reflexes are 2+ on the right side and 2+ on the left side.       Achilles reflexes are 2+ on the right side and 2+ on the left side.  Psychiatric:         Speech: Speech normal.         Neurologic Exam     Mental Status   Oriented to person, place, and time.   Follows 3 step commands.   Attention: normal. Concentration: normal.   Speech: speech is normal   Level of consciousness: alert  Knowledge: consistent with education.   Normal comprehension.     Cranial Nerves     CN III, IV, VI   Pupils are equal, round, and reactive to light.  Right pupil: Accommodation: intact.   Left pupil: Accommodation: intact.   CN III: no CN III palsy  CN VI: no CN VI palsy  Nystagmus: none   Diplopia: none  Upgaze: normal  Downgaze: normal  Conjugate gaze: present    CN VII   Facial expression full, symmetric.     CN VIII   Hearing: intact    CN XII   CN XII normal.     Motor Exam   Muscle bulk: normal  Overall muscle tone: normal    Strength   Right biceps: 5/5  Left biceps: 5/5  Right triceps: 5/5  Left triceps: 5/5  Right interossei: 5/5  Left interossei: 5/5  Right quadriceps: 5/5  Left quadriceps: 5/5  Right anterior tibial: 5/5  Left anterior tibial: 5/5  Right posterior tibial: 5/5  Left posterior tibial: 5/5    Sensory Exam   Light touch normal.     Gait, Coordination, and Reflexes     Gait  Gait: normal    Tremor   Resting tremor: absent  Action tremor: absent    Reflexes   Right brachioradialis: 2+  Left brachioradialis: 2+  Right biceps: 2+  Left biceps: 2+  Right triceps: 2+  Left triceps: 2+  Right patellar: 2+  Left patellar: 2+  Right achilles: 2+  Left achilles: 2+  Right : 2+  Left : 2+       Vital Signs:   Vitals:    05/05/22 0958   BP: 120/78   Pulse: 88   Temp: 96.9 °F (36.1 °C)   SpO2: 97%   Weight: 89.8 kg (198 lb)   Height: 180.3 cm (70.98\")     Body " mass index is 27.63 kg/m².         Assessment / Plan      Assessment/Plan:   Diagnoses and all orders for this visit:    1. Paresthesia (Primary)  -     EMG & Nerve Conduction Test; Future  -     CK; Future  -     C-reactive Protein; Future  -     Vitamin B12 & Folate; Future  -     TSH; Future  -     KARI by IFA, Reflex 9-biomarkers profile; Future     Discussed staring medication but he wants to wait at this time. Will likely try Elavil.    Patient Education:     Reviewed medications, potential side effects and signs and symptoms to report. Discussed risk versus benefits of treatment plan with patient and/or family-including medications, labs and radiology that may be ordered. Addressed questions and concerns during visit. Patient and/or family verbalized understanding and agree with plan. Instructed to call the office with any questions and report to ER with any life-threatening symptoms.     Follow Up:   Return in about 6 weeks (around 6/16/2022) for Recheck.    During this visit the following were done:  Labs Reviewed []    Labs Ordered [x]    Radiology Reports Reviewed []    Radiology Ordered []    PCP Records Reviewed []    Referring Provider Records Reviewed []    ER Records Reviewed []    Hospital Records Reviewed []    History Obtained From Family []    Radiology Images Reviewed []    Other Reviewed [x]    Records Requested []      Jason Holland, LYNETTE, APRN

## 2022-05-06 ENCOUNTER — TELEPHONE (OUTPATIENT)
Dept: NEUROLOGY | Facility: CLINIC | Age: 22
End: 2022-05-06

## 2022-05-06 LAB
ANA SER QL IF: NEGATIVE
LABORATORY COMMENT REPORT: NORMAL

## 2022-05-06 NOTE — TELEPHONE ENCOUNTER
Called patient and left  with results.      ----- Message from Jason Holland DNP, VERENA sent at 5/6/2022 12:53 PM EDT -----  Please notify pt labs are normal. Thyroid, muscle breakdown, vit b12, folate, inflammatory markers, and autoimmune connective tissues disease test were negative.

## 2022-05-11 ENCOUNTER — PATIENT ROUNDING (BHMG ONLY) (OUTPATIENT)
Dept: NEUROLOGY | Facility: CLINIC | Age: 22
End: 2022-05-11

## 2022-05-11 NOTE — PROGRESS NOTES
May 11, 2022    Hello, may I speak with Konrad Aquino?    My name is alena       I am  with [facility]  [address]  [place] [facility] [place] [facility] [address] [phone].    Before we get started may I verify your date of birth? 2000    I am calling to officially welcome you to our practice and ask about your recent visit. Is this a good time to talk?  Patient rounding sent through [employer].

## 2022-05-23 ENCOUNTER — OFFICE VISIT (OUTPATIENT)
Dept: INTERNAL MEDICINE | Facility: CLINIC | Age: 22
End: 2022-05-23

## 2022-05-23 VITALS
HEART RATE: 85 BPM | SYSTOLIC BLOOD PRESSURE: 114 MMHG | DIASTOLIC BLOOD PRESSURE: 76 MMHG | TEMPERATURE: 97.2 F | HEIGHT: 71 IN | BODY MASS INDEX: 27.35 KG/M2 | OXYGEN SATURATION: 98 % | WEIGHT: 195.4 LBS

## 2022-05-23 DIAGNOSIS — R30.0 DIFFICULT OR PAINFUL URINATION: Primary | ICD-10-CM

## 2022-05-23 LAB
BILIRUB BLD-MCNC: NEGATIVE MG/DL
CLARITY, POC: ABNORMAL
COLOR UR: YELLOW
EXPIRATION DATE: ABNORMAL
GLUCOSE UR STRIP-MCNC: NEGATIVE MG/DL
KETONES UR QL: NEGATIVE
LEUKOCYTE EST, POC: NEGATIVE
Lab: ABNORMAL
NITRITE UR-MCNC: NEGATIVE MG/ML
PH UR: 6 [PH] (ref 5–8)
PROT UR STRIP-MCNC: ABNORMAL MG/DL
RBC # UR STRIP: NEGATIVE /UL
SP GR UR: 1.02 (ref 1–1.03)
UROBILINOGEN UR QL: NORMAL

## 2022-05-23 PROCEDURE — 99214 OFFICE O/P EST MOD 30 MIN: CPT | Performed by: FAMILY MEDICINE

## 2022-05-23 RX ORDER — PHENAZOPYRIDINE HYDROCHLORIDE 100 MG/1
100 TABLET, FILM COATED ORAL 3 TIMES DAILY PRN
Qty: 9 TABLET | Refills: 0 | Status: SHIPPED | OUTPATIENT
Start: 2022-05-23 | End: 2022-06-03

## 2022-05-23 NOTE — PROGRESS NOTES
Subjective     Konrad Aquino is a 21 y.o. male.     Chief Complaint   Patient presents with   • Difficulty Urinating     Burning          History of Present Illness     C/o dysuria for 3 days, normal in color , no urgency or frequency   No abd pain  No N,V  NO f,c       The following portions of the patient's history were reviewed and updated as appropriate: allergies, current medications, past family history, past medical history, past social history, past surgical history and problem list.        Review of Systems   Constitutional: Negative for activity change, appetite change, chills, fatigue and fever.   Gastrointestinal: Negative for abdominal pain, nausea and vomiting.   Genitourinary: Positive for difficulty urinating and dysuria. Negative for decreased urine volume, discharge, flank pain, frequency, genital sores, hematuria, testicular pain, urgency and urinary incontinence.   Musculoskeletal: Negative for back pain.       Vitals:    05/23/22 1541   BP: 114/76   Pulse: 85   Temp: 97.2 °F (36.2 °C)   SpO2: 98%           05/23/22  1541   Weight: 88.6 kg (195 lb 6.4 oz)         Body mass index is 27.27 kg/m².      Current Outpatient Medications   Medication Sig Dispense Refill   • Advair Diskus 250-50 MCG/DOSE DISKUS Inhale 1 puff 2 (Two) Times a Day.     • albuterol sulfate  (90 Base) MCG/ACT inhaler Inhale 2 puffs Every 4 (Four) Hours As Needed for Wheezing. 18 g 3   • ketoconazole (NIZORAL) 2 % shampoo      • montelukast (SINGULAIR) 10 MG tablet Take 10 mg by mouth Every Night.     • olopatadine (PATANOL) 0.1 % ophthalmic solution INSTILL 1 DROP INTO AFFECTED EYES TWICE DAILY AS NEEDED FOR ALLERGIES     • Wal-itin D 24 Hour  MG per 24 hr tablet TAKE 1 TABLET BY MOUTH IN THE MORNING AS NEEDED FOR ALLERGIES     • phenazopyridine (Pyridium) 100 MG tablet Take 1 tablet by mouth 3 (Three) Times a Day As Needed for Bladder Spasms. 9 tablet 0     No current facility-administered medications for  this visit.                Objective   Physical Exam  Vitals and nursing note reviewed.   Constitutional:       General: He is not in acute distress.     Appearance: He is not ill-appearing, toxic-appearing or diaphoretic.   HENT:      Mouth/Throat:      Mouth: Mucous membranes are moist.   Abdominal:      General: Bowel sounds are normal. There is no distension.      Palpations: Abdomen is soft. There is no mass.      Tenderness: There is no abdominal tenderness. There is no right CVA tenderness, left CVA tenderness, guarding or rebound.      Hernia: No hernia is present.   Skin:     General: Skin is warm.      Coloration: Skin is not pale.      Findings: No erythema.   Neurological:      Mental Status: He is alert and oriented to person, place, and time.   Psychiatric:         Mood and Affect: Mood normal.         Behavior: Behavior normal.         Thought Content: Thought content normal.         Judgment: Judgment normal.           Assessment & Plan   Diagnoses and all orders for this visit:    1. Difficult or painful urination (Primary)  -     POCT urinalysis dipstick, automated--> urine showed trace protein   - Will do another UA in 2 weeks , ordered    -     phenazopyridine (Pyridium) 100 MG tablet; Take 1 tablet by mouth 3 (Three) Times a Day As Needed for Bladder Spasms.  Dispense: 9 tablet; Refill: 0  -     Chlamydia trachomatis, Neisseria gonorrhoeae, PCR w/ confirmation - Urine, Urine, Clean Catch; Future        I was wearing N95 mask and eye protection during the entire duration of the visit.   Patient was masked the whole entire time.   Minimum social distance of 6 ft maintained through entire visit except for physical exam as documented.          I have fully discussed the nature of the medical condition(s) risks, complications, management, safe and proper use of medications.   I have discussed the SIDE EFFECT OF MEDICATION and importance TO report any side effect , the patient expressed good  understanding.  Encouraged medication compliance and the importance of keeping scheduled follow up appointments with me and any other providers.    Patient instructed to follow up with our office for results on any labs/imaging ordered during this visit.    Home care discussed  All questions answered  Patient verbalizes understanding and agrees to treatment plan.     Follow up: Return for will check urine in 2 weeks .

## 2022-05-24 PROCEDURE — 87591 N.GONORRHOEAE DNA AMP PROB: CPT | Performed by: FAMILY MEDICINE

## 2022-05-24 PROCEDURE — 87491 CHLMYD TRACH DNA AMP PROBE: CPT | Performed by: FAMILY MEDICINE

## 2022-05-26 LAB
C TRACH RRNA SPEC QL NAA+PROBE: NEGATIVE
N GONORRHOEA RRNA SPEC QL NAA+PROBE: NEGATIVE

## 2022-05-31 ENCOUNTER — TELEPHONE (OUTPATIENT)
Dept: INTERNAL MEDICINE | Facility: CLINIC | Age: 22
End: 2022-05-31

## 2022-05-31 NOTE — TELEPHONE ENCOUNTER
Pn pt expressed understanding   Pt noticed some redness and scheduled an appt to be seen ZULAY  --- Message from Scotty Richey MD sent at 5/31/2022  8:54 AM EDT -----  PLEASE call for normal urine results, no STD

## 2022-06-03 ENCOUNTER — OFFICE VISIT (OUTPATIENT)
Dept: INTERNAL MEDICINE | Facility: CLINIC | Age: 22
End: 2022-06-03

## 2022-06-03 VITALS
HEART RATE: 104 BPM | BODY MASS INDEX: 27.3 KG/M2 | DIASTOLIC BLOOD PRESSURE: 64 MMHG | HEIGHT: 71 IN | SYSTOLIC BLOOD PRESSURE: 122 MMHG | OXYGEN SATURATION: 98 % | RESPIRATION RATE: 20 BRPM | TEMPERATURE: 97.1 F | WEIGHT: 195 LBS

## 2022-06-03 DIAGNOSIS — R30.9 PAINFUL URINATION: Primary | ICD-10-CM

## 2022-06-03 LAB
BILIRUB BLD-MCNC: NEGATIVE MG/DL
CLARITY, POC: ABNORMAL
COLOR UR: ABNORMAL
EXPIRATION DATE: ABNORMAL
GLUCOSE UR STRIP-MCNC: NEGATIVE MG/DL
KETONES UR QL: NEGATIVE
LEUKOCYTE EST, POC: NEGATIVE
Lab: ABNORMAL
NITRITE UR-MCNC: NEGATIVE MG/ML
PH UR: 7 [PH] (ref 5–8)
PROT UR STRIP-MCNC: ABNORMAL MG/DL
RBC # UR STRIP: NEGATIVE /UL
SP GR UR: 1.01 (ref 1–1.03)
UROBILINOGEN UR QL: NORMAL

## 2022-06-03 PROCEDURE — 87491 CHLMYD TRACH DNA AMP PROBE: CPT | Performed by: NURSE PRACTITIONER

## 2022-06-03 PROCEDURE — 87086 URINE CULTURE/COLONY COUNT: CPT | Performed by: NURSE PRACTITIONER

## 2022-06-03 PROCEDURE — 99213 OFFICE O/P EST LOW 20 MIN: CPT | Performed by: NURSE PRACTITIONER

## 2022-06-03 PROCEDURE — 87661 TRICHOMONAS VAGINALIS AMPLIF: CPT | Performed by: NURSE PRACTITIONER

## 2022-06-03 PROCEDURE — 87591 N.GONORRHOEAE DNA AMP PROB: CPT | Performed by: NURSE PRACTITIONER

## 2022-06-03 RX ORDER — CIPROFLOXACIN 500 MG/1
500 TABLET, FILM COATED ORAL 2 TIMES DAILY
Qty: 14 TABLET | Refills: 0 | Status: SHIPPED | OUTPATIENT
Start: 2022-06-03 | End: 2022-06-10

## 2022-06-03 RX ORDER — PHENAZOPYRIDINE HYDROCHLORIDE 200 MG/1
200 TABLET, FILM COATED ORAL 3 TIMES DAILY PRN
Qty: 10 TABLET | Refills: 0 | Status: SHIPPED | OUTPATIENT
Start: 2022-06-03 | End: 2022-10-03

## 2022-06-03 NOTE — PROGRESS NOTES
Konrad Aquino presents to [facility] for     Chief Complaint  Chief Complaint   Patient presents with   • Difficulty Urinating     STD testing performed   Protein in last POC Urine          Subjective        Dysuria   This is a new problem. The current episode started 1 to 4 weeks ago. The problem occurs intermittently. The problem has been waxing and waning. The quality of the pain is described as burning. The pain is at a severity of 4/10. There has been no fever. The fever has been present for less than 1 day. He is sexually active. There is no history of pyelonephritis. Associated symptoms include flank pain and urgency. Pertinent negatives include no chills, discharge, frequency, hematuria, hesitancy, nausea, possible pregnancy, sweats or vomiting. He has tried nothing for the symptoms. The treatment provided no relief. There is no history of recurrent UTIs, a single kidney, urinary stasis or a urological procedure.   He was previously seen by Dr. Richey on 5/23/2022 for the same issue.  He was treated with Pyridium.  He continues to experience symptoms.  He was tested for gonorrhea and chlamydia at that time both of which were negative.      Review of Systems   Constitutional: Negative for chills.   Gastrointestinal: Negative for nausea and vomiting.   Genitourinary: Positive for difficulty urinating, dysuria, flank pain and urgency. Negative for frequency, hematuria and hesitancy.         Allergies   Allergen Reactions   • Flonase [Fluticasone] Other (See Comments)     Panic attacks   • Latex Rash   • Penicillins Rash     Current Outpatient Medications on File Prior to Visit   Medication Sig Dispense Refill   • Advair Diskus 250-50 MCG/DOSE DISKUS Inhale 1 puff 2 (Two) Times a Day.     • albuterol sulfate  (90 Base) MCG/ACT inhaler Inhale 2 puffs Every 4 (Four) Hours As Needed for Wheezing. 18 g 3   • ketoconazole (NIZORAL) 2 % shampoo      • montelukast  "(SINGULAIR) 10 MG tablet Take 10 mg by mouth Every Night.     • olopatadine (PATANOL) 0.1 % ophthalmic solution INSTILL 1 DROP INTO AFFECTED EYES TWICE DAILY AS NEEDED FOR ALLERGIES     • Wal-itin D 24 Hour  MG per 24 hr tablet TAKE 1 TABLET BY MOUTH IN THE MORNING AS NEEDED FOR ALLERGIES       No current facility-administered medications on file prior to visit.         The following portions of the patient's history were reviewed and updated as appropriate: allergies, current medications, past family history, past medical history, past social history, past surgical history and problem list and are available for review within electronic record    Objective     Result Review :       The following data was reviewed by: VERENA Boyce on 06/03/2022:    Results for orders placed or performed in visit on 06/03/22   POCT urinalysis dipstick, automated    Specimen: Urine   Result Value Ref Range    Color Dark Yellow Yellow, Straw, Dark Yellow, Smita    Clarity, UA Cloudy (A) Clear    Specific Gravity  1.015 1.005 - 1.030    pH, Urine 7.0 5.0 - 8.0    Leukocytes Negative Negative    Nitrite, UA Negative Negative    Protein, POC 1+ (A) Negative mg/dL    Glucose, UA Negative Negative, 1000 mg/dL (3+) mg/dL    Ketones, UA Negative Negative    Urobilinogen, UA Normal Normal    Bilirubin Negative Negative    Blood, UA Negative Negative    Lot Number [id number]     Expiration Date 10/21/2023                   Vital Signs:   /64   Pulse 104   Temp 97.1 °F (36.2 °C) (Temporal)   Resp 20   Ht 180.3 cm (70.98\")   Wt 88.5 kg (195 lb)   SpO2 98%   BMI 27.21 kg/m²         Physical Exam            Assessment and Plan      Diagnoses and all orders for this visit:    1. Painful urination (Primary)  -     POCT urinalysis dipstick, automated  -     phenazopyridine (Pyridium) 200 MG tablet; Take 1 tablet by mouth 3 (Three) Times a Day As Needed for Bladder Spasms.  Dispense: 10 tablet; Refill: 0  -     Urine " Culture - , Urine, Clean Catch; Future  -     ciprofloxacin (Cipro) 500 MG tablet; Take 1 tablet by mouth 2 (Two) Times a Day for 7 days.  Dispense: 14 tablet; Refill: 0  -     Chlamydia trachomatis, Neisseria gonorrhoeae, Trichomonas vaginalis, PCR - Swab, Urine, Clean Catch; Future  -     Urine Culture - Urine, Urine, Clean Catch  -     Chlamydia trachomatis, Neisseria gonorrhoeae, Trichomonas vaginalis, PCR - Swab, Urine, Clean Catch    Urine dip looks okay in office.  We will go ahead and send for urine culture and recheck for STIs to include trichomonas this time.  We will treat with ciprofloxacin as I do suspect that there may be an element of urethritis involved here.  Pyridium as needed.      Follow Up     Patient was given instructions and counseling regarding his condition or for health maintenance advice. Please see specific information pulled into the AVS if appropriate.   Any new medication's adverse effects have been discussed in detail with patient  Patient was encouraged to keep me informed of any acute changes, lack of improvement, or any new concerning symptoms.    Return if symptoms worsen or fail to improve.          Dictated Utilizing Dragon Dictation   Please note that portions of this note were completed with a voice recognition program.   Part of this note may be an electronic transcription/translation of spoken language to printed text using the Dragon Dictation System.

## 2022-06-04 LAB — BACTERIA SPEC AEROBE CULT: NO GROWTH

## 2022-06-06 LAB
C TRACH RRNA SPEC QL NAA+PROBE: NEGATIVE
N GONORRHOEA RRNA SPEC QL NAA+PROBE: NEGATIVE
T VAGINALIS RRNA SPEC QL NAA+PROBE: NEGATIVE

## 2022-06-27 ENCOUNTER — OFFICE VISIT (OUTPATIENT)
Dept: INTERNAL MEDICINE | Facility: CLINIC | Age: 22
End: 2022-06-27

## 2022-06-27 VITALS
DIASTOLIC BLOOD PRESSURE: 62 MMHG | HEART RATE: 87 BPM | SYSTOLIC BLOOD PRESSURE: 110 MMHG | WEIGHT: 195 LBS | BODY MASS INDEX: 27.3 KG/M2 | HEIGHT: 71 IN | TEMPERATURE: 97 F | OXYGEN SATURATION: 98 %

## 2022-06-27 DIAGNOSIS — J06.9 UPPER RESPIRATORY TRACT INFECTION, UNSPECIFIED TYPE: ICD-10-CM

## 2022-06-27 DIAGNOSIS — R05.9 COUGH: Primary | ICD-10-CM

## 2022-06-27 DIAGNOSIS — R09.81 NASAL CONGESTION: ICD-10-CM

## 2022-06-27 LAB
EXPIRATION DATE: NORMAL
FLUAV AG UPPER RESP QL IA.RAPID: NOT DETECTED
FLUBV AG UPPER RESP QL IA.RAPID: NOT DETECTED
INTERNAL CONTROL: NORMAL
Lab: NORMAL
SARS-COV-2 RNA RESP QL NAA+PROBE: NOT DETECTED

## 2022-06-27 PROCEDURE — 99214 OFFICE O/P EST MOD 30 MIN: CPT | Performed by: FAMILY MEDICINE

## 2022-06-27 PROCEDURE — 87636 SARSCOV2 & INF A&B AMP PRB: CPT | Performed by: FAMILY MEDICINE

## 2022-06-27 RX ORDER — BROMPHENIRAMINE MALEATE, PSEUDOEPHEDRINE HYDROCHLORIDE, AND DEXTROMETHORPHAN HYDROBROMIDE 2; 30; 10 MG/5ML; MG/5ML; MG/5ML
5 SYRUP ORAL 4 TIMES DAILY PRN
Qty: 118 ML | Refills: 0 | Status: SHIPPED | OUTPATIENT
Start: 2022-06-27 | End: 2022-10-03

## 2022-06-27 RX ORDER — DOXYCYCLINE HYCLATE 100 MG/1
100 CAPSULE ORAL 2 TIMES DAILY
Qty: 20 CAPSULE | Refills: 0 | Status: SHIPPED | OUTPATIENT
Start: 2022-06-27 | End: 2022-10-03

## 2022-06-27 RX ORDER — AZITHROMYCIN 250 MG/1
250 TABLET, FILM COATED ORAL EVERY 24 HOURS
COMMUNITY
Start: 2022-06-25 | End: 2022-06-30

## 2022-06-27 NOTE — PROGRESS NOTES
Subjective     Konrad Aquino is a 21 y.o. male.     Chief Complaint   Patient presents with   • Cough     FLUID IN CHEST      • Nasal Congestion     YELLOW MUCUS     • Sore Throat     Had strep in march          History of Present Illness     He is c/o cough, congestion , nasal drainage , sinus pressure , PND, green sputum for > 2 weeks.  He had strep recently , was on Zpak.  His sx gets worse lately.  No F,C       The following portions of the patient's history were reviewed and updated as appropriate: allergies, current medications, past family history, past medical history, past social history, past surgical history and problem list.        Review of Systems   Constitutional: Negative for activity change, appetite change, chills and fever.   HENT: Positive for congestion, ear pain, sinus pressure, sore throat and voice change. Negative for swollen glands and trouble swallowing.    Respiratory: Positive for cough. Negative for shortness of breath and wheezing.        Vitals:    06/27/22 1359   BP: 110/62   Pulse: 87   Temp: 97 °F (36.1 °C)   SpO2: 98%           06/27/22  1359   Weight: 88.5 kg (195 lb)         Body mass index is 27.21 kg/m².      Current Outpatient Medications   Medication Sig Dispense Refill   • Advair Diskus 250-50 MCG/DOSE DISKUS Inhale 1 puff 2 (Two) Times a Day.     • albuterol sulfate  (90 Base) MCG/ACT inhaler Inhale 2 puffs Every 4 (Four) Hours As Needed for Wheezing. 18 g 3   • azithromycin (ZITHROMAX) 250 MG tablet Take 250 mg by mouth Daily.     • ketoconazole (NIZORAL) 2 % shampoo      • Wal-itin D 24 Hour  MG per 24 hr tablet TAKE 1 TABLET BY MOUTH IN THE MORNING AS NEEDED FOR ALLERGIES     • brompheniramine-pseudoephedrine-DM 30-2-10 MG/5ML syrup Take 5 mL by mouth 4 (Four) Times a Day As Needed for Congestion or Cough. 118 mL 0   • Chlorcyclizine-Pseudoephed 25-60 MG/5ML liquid Every 8 (Eight) Hours.     • doxycycline (VIBRAMYCIN) 100 MG capsule Take 1 capsule by  mouth 2 (Two) Times a Day. 20 capsule 0   • montelukast (SINGULAIR) 10 MG tablet Take 10 mg by mouth Every Night.     • olopatadine (PATANOL) 0.1 % ophthalmic solution INSTILL 1 DROP INTO AFFECTED EYES TWICE DAILY AS NEEDED FOR ALLERGIES     • phenazopyridine (Pyridium) 200 MG tablet Take 1 tablet by mouth 3 (Three) Times a Day As Needed for Bladder Spasms. 10 tablet 0     No current facility-administered medications for this visit.                Objective   Physical Exam  Vitals and nursing note reviewed.   Constitutional:       General: He is not in acute distress.     Appearance: He is not ill-appearing, toxic-appearing or diaphoretic.   HENT:      Right Ear: Tympanic membrane, ear canal and external ear normal.      Left Ear: Tympanic membrane is injected.      Nose: Congestion and rhinorrhea present.      Right Sinus: Frontal sinus tenderness present. No maxillary sinus tenderness.      Left Sinus: Frontal sinus tenderness present. No maxillary sinus tenderness.      Mouth/Throat:      Mouth: Mucous membranes are moist.      Pharynx: Oropharynx is clear. No oropharyngeal exudate or posterior oropharyngeal erythema.   Eyes:      Conjunctiva/sclera: Conjunctivae normal.   Cardiovascular:      Rate and Rhythm: Normal rate and regular rhythm.      Heart sounds: Normal heart sounds. No murmur heard.  Pulmonary:      Effort: Pulmonary effort is normal. No respiratory distress.      Breath sounds: Normal breath sounds. No stridor. No wheezing or rhonchi.   Musculoskeletal:      Cervical back: Neck supple.   Lymphadenopathy:      Cervical: No cervical adenopathy.   Neurological:      Mental Status: He is alert and oriented to person, place, and time.   Psychiatric:         Mood and Affect: Mood normal.         Behavior: Behavior normal.         Thought Content: Thought content normal.           Assessment & Plan   Diagnoses and all orders for this visit:    1. Cough (Primary)  -     Covid-19 + Flu A&B AG, Veritor-->  NEG  -     brompheniramine-pseudoephedrine-DM 30-2-10 MG/5ML syrup; Take 5 mL by mouth 4 (Four) Times a Day As Needed for Congestion or Cough.  Dispense: 118 mL; Refill: 0    2. Nasal congestion  -     Covid-19 + Flu A&B AG, Veritor    3. Upper respiratory tract infection, unspecified type  -     doxycycline (VIBRAMYCIN) 100 MG capsule; Take 1 capsule by mouth 2 (Two) Times a Day.  Dispense: 20 capsule; Refill: 0       I was wearing N95 mask and eye protection during the entire duration of the visit.   Patient was masked the whole entire time.   Minimum social distance of 6 ft maintained through entire visit except for physical exam as documented.          I have fully discussed the nature of the medical condition(s) risks, complications, management, safe and proper use of medications.   I have discussed the SIDE EFFECT OF MEDICATION and importance TO report any side effect , the patient expressed good understanding.  Encouraged medication compliance and the importance of keeping scheduled follow up appointments with me and any other providers.    Patient instructed to follow up with our office for results on any labs/imaging ordered during this visit.    Home care discussed  All questions answered  Patient verbalizes understanding and agrees to treatment plan.     Follow up: Return for if no better or worsening symptoms.

## 2022-07-06 ENCOUNTER — TELEPHONE (OUTPATIENT)
Dept: INTERNAL MEDICINE | Facility: CLINIC | Age: 22
End: 2022-07-06

## 2022-07-06 NOTE — TELEPHONE ENCOUNTER
Caller: Konrad Aquino    Relationship: Self    Best call back number: [phone]    What is the medical concern/diagnosis: painful urination    What specialty or service is being requested: urology    What is the provider, practice or medical service name:     What is the office location:     What is the office phone number:     Any additional details:  Patient would prefer a urologist in [place], patient is on medicaid and would prefer someone who accepts medicaid

## 2022-07-06 NOTE — TELEPHONE ENCOUNTER
Please advise, it looks like the patient has been several times in the past few months for this issue. [facility] but is establishing care with you on 07/11/2022. Please let us know if he needs to be seen first referral is entered.

## 2022-07-12 ENCOUNTER — PROCEDURE VISIT (OUTPATIENT)
Dept: NEUROLOGY | Facility: CLINIC | Age: 22
End: 2022-07-12

## 2022-07-12 DIAGNOSIS — R20.0 NUMBNESS AND TINGLING OF BOTH FEET: Primary | ICD-10-CM

## 2022-07-12 DIAGNOSIS — R20.2 NUMBNESS AND TINGLING OF BOTH FEET: Primary | ICD-10-CM

## 2022-07-12 PROCEDURE — 95911 NRV CNDJ TEST 9-10 STUDIES: CPT | Performed by: PSYCHIATRY & NEUROLOGY

## 2022-07-12 PROCEDURE — 95886 MUSC TEST DONE W/N TEST COMP: CPT | Performed by: PSYCHIATRY & NEUROLOGY

## 2022-07-12 NOTE — PROGRESS NOTES
[facility] [place]   Electrodiagnostic Laboratory    Nerve Conduction & EMG Report        Patient:  Konrad Aquino   Patient ID: [id number]   YOB: 2000  Sex:  male      Exam Physician:  Gallito Webb MD  Refer Physician:  Jason ALBARADO    Electromyogram and Nerve Conduction Velocity Procedure Note    Hx: 21 y.o. right handed male with complaint of numbness involving the both lower extremities. Symptoms have been present for several months and were provoked by no clear event. Significant past medical history includes nothing suggestive of neuropathy.  Family history no family history of nerve or muscle disease.    Exam: Motor power is normal. There is no atrophy. There are no fasciculations. Deep tendon reflexes are present and symmetrical. Sensory exam is normal.      Edx studies of the B LE were performed to evaluate for peripheral neuropathy.      NCS Examination   For sensory nerve conduction studies, the amplitude is measured peak-to-peak, the latency reported is the distal peak latency, and the conduction velocity, if measured, is determined from onset latencies and is over the forearm.   For motor nerve conduction studies, the amplitude is measured baseline-to-peak, the latency reported is the distal onset latency, the conduction velocity is calculated over the forearm, and the F wave latency is the minimum latency.   Unless otherwise noted, the hand temperature was monitored continuously and remained between 32°C and 36°C during the performance of the NCSs.        Sensory NCS      Nerve / Sites Rec. Site Onset Lat Peak Lat NP Amp PP Amp Segments Distance Velocity     ms ms µV µV  mm m/s   L Sural - Ankle (Calf)      Calf Ankle 1.61 2.50 66.1 36.6 Calf - Ankle 140 87   R Sural - Ankle (Calf)      Calf Ankle 0.89 1.20 28.5 107.6 Calf - Ankle 140 158   L Superficial peroneal - Ankle      Lat leg Ankle 3.02 3.91 42.7 22.0 Lat leg - Ankle 140 46   R Superficial peroneal -  Ankle      Lat leg Ankle 2.14 2.71 6.0 14.7 Lat leg - Ankle 140 66               Motor NCS      Nerve / Sites Muscle Latency Amplitude Amp % Duration Segments Distance Lat Diff Velocity     ms mV % ms  mm ms m/s   L Peroneal - EDB      Ankle EDB 2.19 8.9 100 5.89 Ankle - EDB 80        Fib head EDB 10.63 3.5 39.9 6.30 Fib head - Ankle 360 8.44 43      Pop fossa EDB 12.66 5.5 61.6 7.03 Pop fossa - Fib head 100 2.03 49   R Peroneal - EDB      Ankle EDB 2.76 9.6 100 5.05 Ankle - EDB 80        Fib head EDB 11.09 6.6 68.4 5.73 Fib head - Ankle 360 8.33 43      Pop fossa EDB 12.71 6.1 63.7 6.35 Pop fossa - Fib head 100 1.61 62   L Tibial - AH      Ankle AH 2.97 8.1 100 5.47 Ankle - AH 80        Pop fossa AH 13.65 4.2 51.9 6.93 Pop fossa - Ankle 440 10.68 41   R Tibial - AH      Ankle AH 3.70 7.1 100 6.67 Ankle - AH 80        Pop fossa AH 13.44 3.8 53.9 7.19 Pop fossa - Ankle 440 9.74 45               F  Wave      Nerve F Lat M Lat F-M Lat    ms ms ms   L Peroneal - EDB 55.0 2.4 52.6   L Tibial - AH 55.3 3.2 52.0   R Tibial - AH 56.1 4.0 52.1   R Peroneal - EDB 51.1 2.7 48.5               H Reflex      Nerve H Lat    ms   L Tibial - Soleus 30.6   R Tibial - Soleus 32.4           EMG         EMG Summary Table     Spontaneous MUAP Recruitment   Muscle Nerve Roots IA Fib PSW Fasc H.F. Amp Dur. PPP Pattern   L. Tibialis anterior Deep peroneal (Fibular) L4-L5 N None None None None N N N N   R. Tibialis anterior Deep peroneal (Fibular) L4-L5 N None None None None N N N N   L. Tibialis posterior Tibial L4-L5 N None None None None N N N N   R. Tibialis posterior Tibial L4-L5 N None None None None N N N N   L. Vastus lateralis Femoral L2-L4 N None None None None N N N N   R. Vastus lateralis Femoral L2-L4 N None None None None N N N N   L. Biceps femoris (long head) Sciatic (tibial division) L5-S2 N None None None None N N N N   R. Biceps femoris (long head) Sciatic (tibial division) L5-S2 N None None None None N N N N   L.  Gastrocnemius (Medial head) Tibial S1-S2 N None None None None N N N N   R. Gastrocnemius (Medial head) Tibial S1-S2 N None None None None N N N N   L. Iliopsoas Femoral L2-L3 N None None None None N N N N   R. Iliopsoas Femoral L2-L3 N None None None None N N N N       Summary    The motor conduction test was normal in all 4 of the tested nerves: L Peroneal - EDB, R Peroneal - EDB, L Tibial - AH, R Tibial - AH.    The sensory conduction test was normal in all 4 of the tested nerves: L Sural - Ankle (Calf), R Sural - Ankle (Calf), L Superficial peroneal - Ankle, R Superficial peroneal - Ankle.    The F wave study was normal in all 4 of the tested nerves: L Peroneal - EDB, L Tibial - AH, R Tibial - AH, R Peroneal - EDB.    The H reflex study was normal in all 2 of the tested nerves: L Tibial - Soleus, R Tibial - Soleus.    The needle EMG study was normal in all 12 tested muscles: L. Tibialis anterior, R. Tibialis anterior, L. Tibialis posterior, R. Tibialis posterior, L. Vastus lateralis, R. Vastus lateralis, L. Biceps femoris (long head), R. Biceps femoris (long head), L. Gastrocnemius (Medial head), R. Gastrocnemius (Medial head), L. Iliopsoas, R. Iliopsoas.       Conclusion: Normal NCV and EMG of the right lower extremity and left lower extremity          Instrument used:  Teca Synergy        Performed by:          Gallito Webb MD

## 2022-10-03 ENCOUNTER — OFFICE VISIT (OUTPATIENT)
Dept: INTERNAL MEDICINE | Facility: CLINIC | Age: 22
End: 2022-10-03

## 2022-10-03 VITALS
SYSTOLIC BLOOD PRESSURE: 116 MMHG | OXYGEN SATURATION: 94 % | HEART RATE: 90 BPM | WEIGHT: 197 LBS | DIASTOLIC BLOOD PRESSURE: 74 MMHG | HEIGHT: 71 IN | BODY MASS INDEX: 27.58 KG/M2 | TEMPERATURE: 97.1 F | RESPIRATION RATE: 20 BRPM

## 2022-10-03 DIAGNOSIS — J45.41 MODERATE PERSISTENT ASTHMA WITH ACUTE EXACERBATION: Primary | ICD-10-CM

## 2022-10-03 DIAGNOSIS — J30.9 ALLERGIC RHINITIS, UNSPECIFIED SEASONALITY, UNSPECIFIED TRIGGER: ICD-10-CM

## 2022-10-03 PROCEDURE — 99214 OFFICE O/P EST MOD 30 MIN: CPT | Performed by: PHYSICIAN ASSISTANT

## 2022-10-03 RX ORDER — ALBUTEROL SULFATE 90 UG/1
2 AEROSOL, METERED RESPIRATORY (INHALATION) EVERY 4 HOURS PRN
Qty: 18 G | Refills: 3 | Status: SHIPPED | OUTPATIENT
Start: 2022-10-03

## 2022-10-03 RX ORDER — LORATADINE AND PSEUDOEPHEDRINE SULFATE 10; 240 MG/1; MG/1
1 TABLET, EXTENDED RELEASE ORAL DAILY
Qty: 30 TABLET | Refills: 2 | Status: SHIPPED | OUTPATIENT
Start: 2022-10-03

## 2022-10-03 RX ORDER — PREDNISONE 10 MG/1
20 TABLET ORAL DAILY
Qty: 10 TABLET | Refills: 0 | Status: SHIPPED | OUTPATIENT
Start: 2022-10-03 | End: 2022-12-13

## 2022-10-03 NOTE — PROGRESS NOTES
Chief Complaint  No chief complaint on file.    Subjective          History of Present Illness  Konrad Aquino presents to [facility] for   History of Present Illness  Asthma/AR:  Has been using his alb more often over the last few weeks and he wanted to get this checked out. Several months ago he was just using his inhaler a few times a week or less, but over the last few months his sx have been becoming worse to the point where he is using his albuterol a few times a day. No fevers, no productive cough. Does not feel sick, just has recurrent wheeze for the last few weeks.   Prednisone causes him to have anxiety and he does not like taking this med if he does not have to.  Has seen allergy dr in the past who started him on allergy shots and breo, breo wasn't covered so they gave him a sample and rx advair. He uses advair 1-2 times a week, forgets to use it often. The breo did help when he was on that, sx flared up about a month after he stopped breo. He was on allergy shots but the last time he got shots (a month or so ago) he felt his throat closing up so he was too nervous to continue. Would like to see another allergist.       Review of Systems   Constitutional: Negative for fever and unexpected weight loss.   HENT: Positive for congestion, postnasal drip, rhinorrhea and sinus pressure.    Respiratory: Positive for cough, shortness of breath and wheezing.    Cardiovascular: Negative for chest pain and palpitations.       The following portions of the patient's history were reviewed and updated as appropriate: allergies, current medications, past family history, past medical history, past social history, past surgical history and problem list.  Allergies   Allergen Reactions   • Flonase [Fluticasone] Other (See Comments)     Panic attacks   • Prednisone Anxiety   • Latex Rash   • Penicillins Rash   • Singulair [Montelukast] Dizziness     Current Outpatient Medications on File  Prior to Visit   Medication Sig Dispense Refill   • ketoconazole (NIZORAL) 2 % shampoo      • [DISCONTINUED] Advair Diskus 250-50 MCG/DOSE DISKUS Inhale 1 puff 2 (Two) Times a Day.     • [DISCONTINUED] albuterol sulfate  (90 Base) MCG/ACT inhaler Inhale 2 puffs Every 4 (Four) Hours As Needed for Wheezing. 18 g 3   • olopatadine (PATANOL) 0.1 % ophthalmic solution INSTILL 1 DROP INTO AFFECTED EYES TWICE DAILY AS NEEDED FOR ALLERGIES     • [DISCONTINUED] brompheniramine-pseudoephedrine-DM 30-2-10 MG/5ML syrup Take 5 mL by mouth 4 (Four) Times a Day As Needed for Congestion or Cough. 118 mL 0   • [DISCONTINUED] Chlorcyclizine-Pseudoephed 25-60 MG/5ML liquid Every 8 (Eight) Hours.     • [DISCONTINUED] doxycycline (VIBRAMYCIN) 100 MG capsule Take 1 capsule by mouth 2 (Two) Times a Day. 20 capsule 0   • [DISCONTINUED] montelukast (SINGULAIR) 10 MG tablet Take 10 mg by mouth Every Night.     • [DISCONTINUED] phenazopyridine (Pyridium) 200 MG tablet Take 1 tablet by mouth 3 (Three) Times a Day As Needed for Bladder Spasms. 10 tablet 0   • [DISCONTINUED] Wal-itin D 24 Hour  MG per 24 hr tablet TAKE 1 TABLET BY MOUTH IN THE MORNING AS NEEDED FOR ALLERGIES       No current facility-administered medications on file prior to visit.     New Medications Ordered This Visit   Medications   • loratadine-pseudoephedrine (Claritin-D 24 Hour)  MG per 24 hr tablet     Sig: Take 1 tablet by mouth Daily.     Dispense:  30 tablet     Refill:  2   • albuterol sulfate  (90 Base) MCG/ACT inhaler     Sig: Inhale 2 puffs Every 4 (Four) Hours As Needed for Wheezing.     Dispense:  18 g     Refill:  3   • Fluticasone Furoate-Vilanterol (Breo Ellipta) 200-25 MCG/INH inhaler     Sig: Inhale 1 puff Daily.     Dispense:  1 each     Refill:  5   • predniSONE (DELTASONE) 10 MG tablet     Sig: Take 2 tablets by mouth Daily.     Dispense:  10 tablet     Refill:  0   • Fluticasone-Umeclidin-Vilant (Trelegy Ellipta) 100-62.5-25  "MCG/INH inhaler     Sig: Inhale 1 puff Daily.     Dispense:  1 each     Refill:  0     Social History     Tobacco Use   Smoking Status Former Smoker   • Packs/day: 0.25   • Years: 4.00   • Pack years: 1.00   • Types: Cigarettes   • Start date: 2019   • Quit date: 9/10/2020   • Years since quittin.0   Smokeless Tobacco Never Used   Tobacco Comment    Mostly vaped but did smoke some cigarettes        Objective   Vital Signs:   Vitals:    10/03/22 1617 10/03/22 1808   BP: 116/74    Pulse: (!) 124 90   Resp: 20    Temp: 97.1 °F (36.2 °C)    TempSrc: Temporal    SpO2: 96% 94%   Weight: 89.4 kg (197 lb)    Height: 180.3 cm (70.98\")    PainSc: 0-No pain       Physical Exam  Vitals reviewed.   Constitutional:       General: He is not in acute distress.     Appearance: Normal appearance.   HENT:      Head: Normocephalic and atraumatic.      Nose: Congestion present.   Eyes:      General: No scleral icterus.     Extraocular Movements: Extraocular movements intact.      Conjunctiva/sclera: Conjunctivae normal.   Cardiovascular:      Rate and Rhythm: Normal rate and regular rhythm.      Heart sounds: Normal heart sounds. No murmur heard.  Pulmonary:      Effort: Pulmonary effort is normal. No respiratory distress.      Breath sounds: No stridor. Wheezing present. No rhonchi.   Musculoskeletal:      Cervical back: Normal range of motion and neck supple.   Skin:     General: Skin is warm and dry.      Coloration: Skin is not jaundiced.   Neurological:      General: No focal deficit present.      Mental Status: He is alert and oriented to person, place, and time.      Gait: Gait normal.   Psychiatric:         Mood and Affect: Mood normal.         Behavior: Behavior normal.        No LMP for male patient.    Result Review :                   Assessment and Plan    Diagnoses and all orders for this visit:    1. Moderate persistent asthma with acute exacerbation (Primary)  Assessment & Plan:  Asthma is worsening.  The " patient is experiencing frequent daytime asthma symptoms. He is experiencing frequent nighttime asthma symptoms.  Discussed monitoring symptoms and use of quick-relief medications and contacting us early in the course of exacerbations.  Warning signs of respiratory distress were reviewed with the patient.   Medications: will start Breo, sample of Trelegy given in office d/t severity of current flare and we don't have Breo samples, Breo will likely need a PA. Will rx low dose of prednisone d/t side effects, ER if has SOA or sx worsen. If sx persist consider adding abx to cover for bronchitis.         Orders:  -     albuterol sulfate  (90 Base) MCG/ACT inhaler; Inhale 2 puffs Every 4 (Four) Hours As Needed for Wheezing.  Dispense: 18 g; Refill: 3  -     Fluticasone Furoate-Vilanterol (Breo Ellipta) 200-25 MCG/INH inhaler; Inhale 1 puff Daily.  Dispense: 1 each; Refill: 5  -     predniSONE (DELTASONE) 10 MG tablet; Take 2 tablets by mouth Daily.  Dispense: 10 tablet; Refill: 0  -     Ambulatory Referral to Allergy  -     Fluticasone-Umeclidin-Vilant (Trelegy Ellipta) 100-62.5-25 MCG/INH inhaler; Inhale 1 puff Daily.  Dispense: 1 each; Refill: 0    2. Allergic rhinitis, unspecified seasonality, unspecified trigger  Assessment & Plan:  Refer to Allergist for second opinion    Orders:  -     loratadine-pseudoephedrine (Claritin-D 24 Hour)  MG per 24 hr tablet; Take 1 tablet by mouth Daily.  Dispense: 30 tablet; Refill: 2  -     Ambulatory Referral to Allergy      Follow Up   Return in about 4 weeks (around 10/31/2022) for Asthma, started Breo. .    Follow up if symptoms worsen or persist or has new or concerning symptoms, go to ER for severe symptoms.   Reviewed common medication effects and side effects and advised to report side effects immediately.  Encouraged medication compliance and the importance of keeping scheduled follow up appointments with me and any other providers.  If a referral was made please  contact our office if you have not heard about an appointment in the next 2 weeks.   If labs or images are ordered we will contact you with the results within the next week.  If you have not heard from us after a week please call our office to inquire about the results.   Patient was given instructions and counseling regarding his condition or for health maintenance advice. Please see specific information pulled into the AVS if appropriate.     Caitlin Tyson PA-C    * Please note that portions of this note were completed with a voice recognition program.

## 2022-10-03 NOTE — ASSESSMENT & PLAN NOTE
Asthma is worsening.  The patient is experiencing frequent daytime asthma symptoms. He is experiencing frequent nighttime asthma symptoms.  Discussed monitoring symptoms and use of quick-relief medications and contacting us early in the course of exacerbations.  Warning signs of respiratory distress were reviewed with the patient.   Medications: will start Breo, sample of Trelegy given in office d/t severity of current flare and we don't have Breo samples, Breo will likely need a PA. Will rx low dose of prednisone d/t side effects, ER if has SOA or sx worsen. If sx persist consider adding abx to cover for bronchitis.

## 2022-10-04 ENCOUNTER — TELEPHONE (OUTPATIENT)
Dept: INTERNAL MEDICINE | Facility: CLINIC | Age: 22
End: 2022-10-04

## 2022-10-04 ENCOUNTER — PRIOR AUTHORIZATION (OUTPATIENT)
Dept: INTERNAL MEDICINE | Facility: CLINIC | Age: 22
End: 2022-10-04

## 2022-10-04 NOTE — TELEPHONE ENCOUNTER
Patient called requesting letter for school from appt 10/03/22 with Caitlin Tyson.  Sent to [employer].

## 2022-10-31 ENCOUNTER — TELEMEDICINE (OUTPATIENT)
Dept: INTERNAL MEDICINE | Facility: CLINIC | Age: 22
End: 2022-10-31

## 2022-10-31 DIAGNOSIS — J02.0 STREP THROAT: ICD-10-CM

## 2022-10-31 DIAGNOSIS — J45.40 MODERATE PERSISTENT ASTHMA WITHOUT COMPLICATION: ICD-10-CM

## 2022-10-31 DIAGNOSIS — U07.1 COVID: Primary | ICD-10-CM

## 2022-10-31 PROBLEM — J06.9 ACUTE URI: Status: ACTIVE | Noted: 2022-10-31

## 2022-10-31 LAB
EXPIRATION DATE: ABNORMAL
EXPIRATION DATE: ABNORMAL
FLUAV AG UPPER RESP QL IA.RAPID: NOT DETECTED
FLUBV AG UPPER RESP QL IA.RAPID: NOT DETECTED
INTERNAL CONTROL: ABNORMAL
INTERNAL CONTROL: ABNORMAL
Lab: ABNORMAL
Lab: ABNORMAL
S PYO AG THROAT QL: POSITIVE
SARS-COV-2 RNA RESP QL NAA+PROBE: DETECTED

## 2022-10-31 PROCEDURE — 87880 STREP A ASSAY W/OPTIC: CPT | Performed by: PHYSICIAN ASSISTANT

## 2022-10-31 PROCEDURE — 99214 OFFICE O/P EST MOD 30 MIN: CPT | Performed by: PHYSICIAN ASSISTANT

## 2022-10-31 PROCEDURE — 87636 SARSCOV2 & INF A&B AMP PRB: CPT | Performed by: PHYSICIAN ASSISTANT

## 2022-10-31 RX ORDER — AZITHROMYCIN 250 MG/1
TABLET, FILM COATED ORAL
Qty: 6 TABLET | Refills: 0 | Status: SHIPPED | OUTPATIENT
Start: 2022-10-31 | End: 2022-12-13

## 2022-10-31 RX ORDER — DEXTROMETHORPHAN HYDROBROMIDE AND PROMETHAZINE HYDROCHLORIDE 15; 6.25 MG/5ML; MG/5ML
5 SYRUP ORAL 4 TIMES DAILY PRN
Qty: 120 ML | Refills: 0 | Status: SHIPPED | OUTPATIENT
Start: 2022-10-31 | End: 2022-11-06

## 2022-10-31 NOTE — ASSESSMENT & PLAN NOTE
Asthma is uncontrolled, he did well on Trelegy but ins did not cover so we rx Breo, he preferred Trelegy.   Tried and failed Breo and Advair.

## 2022-10-31 NOTE — PROGRESS NOTES
Mode of Visit: Video  Location of patient: Home address  Location of provider: [facility] office at [address], [place] [address]  You have chosen to receive care through a telehealth visit.  Do you consent to use a audio/video connection for your medical care today? Yes  This was an audio and video enabled telemedicine encounter.  No technical issues occurred during this visit.    Chief Complaint  Sore Throat    Subjective          History of Present Illness  Konrad Aquino presents to [facility] for   History of Present Illness  Sore throat:  Sx started yesterday with sore throat and runny nose. No cough, has felt feverish this morning. He has asthma but has not had any wheeze recently. No n/v/d.     Asthma:  Uses alb a few times a week, started breo but preferred the trelegy that we gave him a sample of previously. Followed by allergy. Had previously been on Advair as well.      Review of Systems   Constitutional: Negative for fever and unexpected weight loss.   HENT: Positive for congestion, rhinorrhea and sore throat. Negative for ear pain and sinus pressure.    Respiratory: Negative for cough, shortness of breath and wheezing.    Cardiovascular: Negative for chest pain and palpitations.   Gastrointestinal: Negative for abdominal pain, diarrhea, nausea and vomiting.   Musculoskeletal: Negative for myalgias.   Skin: Negative for rash.   Neurological: Negative for headache.       The following portions of the patient's history were reviewed and updated as appropriate: allergies, current medications, past family history, past medical history, past social history, past surgical history and problem list.    Allergies   Allergen Reactions   • Flonase [Fluticasone] Other (See Comments)     Panic attacks   • Prednisone Anxiety   • Latex Rash   • Penicillins Rash   • Singulair [Montelukast] Dizziness       Current Outpatient Medications:   •   Fluticasone-Umeclidin-Vilant (Trelegy Ellipta) 100-62.5-25 MCG/ACT inhaler, Inhale 1 puff Daily., Disp: 1 each, Rfl: 0  •  albuterol sulfate  (90 Base) MCG/ACT inhaler, Inhale 2 puffs Every 4 (Four) Hours As Needed for Wheezing., Disp: 18 g, Rfl: 3  •  azithromycin (Zithromax Z-Matt) 250 MG tablet, Take 2 tablets by mouth on day 1, then 1 tablet daily on days 2-5, Disp: 6 tablet, Rfl: 0  •  ketoconazole (NIZORAL) 2 % shampoo, , Disp: , Rfl:   •  loratadine-pseudoephedrine (Claritin-D 24 Hour)  MG per 24 hr tablet, Take 1 tablet by mouth Daily., Disp: 30 tablet, Rfl: 2  •  olopatadine (PATANOL) 0.1 % ophthalmic solution, INSTILL 1 DROP INTO AFFECTED EYES TWICE DAILY AS NEEDED FOR ALLERGIES, Disp: , Rfl:   •  predniSONE (DELTASONE) 10 MG tablet, Take 2 tablets by mouth Daily., Disp: 10 tablet, Rfl: 0  •  promethazine-dextromethorphan (PROMETHAZINE-DM) 6.25-15 MG/5ML syrup, Take 5 mL by mouth 4 (Four) Times a Day As Needed for Cough for up to 6 days., Disp: 120 mL, Rfl: 0  New Medications Ordered This Visit   Medications   • azithromycin (Zithromax Z-Matt) 250 MG tablet     Sig: Take 2 tablets by mouth on day 1, then 1 tablet daily on days 2-5     Dispense:  6 tablet     Refill:  0   • Fluticasone-Umeclidin-Vilant (Trelegy Ellipta) 100-62.5-25 MCG/ACT inhaler     Sig: Inhale 1 puff Daily.     Dispense:  1 each     Refill:  0   • promethazine-dextromethorphan (PROMETHAZINE-DM) 6.25-15 MG/5ML syrup     Sig: Take 5 mL by mouth 4 (Four) Times a Day As Needed for Cough for up to 6 days.     Dispense:  120 mL     Refill:  0     Social History     Tobacco Use   Smoking Status Former   • Packs/day: 0.25   • Years: 4.00   • Pack years: 1.00   • Types: Cigarettes   • Start date: 2019   • Quit date: 9/10/2020   • Years since quittin.1   Smokeless Tobacco Never   Tobacco Comments    Mostly vaped but did smoke some cigarettes        Objective   There were no vitals filed for this visit.  There is no height or  weight on file to calculate BMI.    Physical Exam   Constitutional: He appears well-developed and well-nourished. No distress.   HENT:   Head: Normocephalic and atraumatic.   Eyes: Conjunctivae and EOM are normal.   Neck: Neck normal appearance.  Pulmonary/Chest: Effort normal.  No respiratory distress. He no audible wheeze...  Neurological: He is alert.   Psychiatric: He has a normal mood and affect.   Vitals reviewed.      Result Review :   Results for orders placed or performed in visit on 10/31/22   JOVI FLU + SARS PCR    Specimen: Nasal Cavity; Swab   Result Value Ref Range    COVID19 Detected (A) Not Detected - Ref. Range    Influenza A Antigen [place] Not Detected Not Detected    Influenza B Antigen [place] Not Detected Not Detected    Internal Control Passed Passed    Lot Number [id number]     Expiration Date 02/06/2023    POC Rapid Strep A    Specimen: Swab   Result Value Ref Range    Rapid Strep A Screen Positive (A) Negative, VALID, INVALID, Not Performed    Internal Control Passed Passed    Lot Number [id number]     Expiration Date 12/14/2024           Assessment and Plan    Diagnoses and all orders for this visit:    1. COVID (Primary)  Assessment & Plan:  You have tested positive for Covid. You should isolate for 10 days after the date your symptoms started.  You may end isolation after day 5 if your symptoms completely resolve but must continue to wear a mask around others for the full 10 days. Please go to ER if you have shortness of breath. Please let me know if your symptoms worsen or persist.      Orders:  -     JOVI FLU + SARS PCR  -     POC Rapid Strep A  -     promethazine-dextromethorphan (PROMETHAZINE-DM) 6.25-15 MG/5ML syrup; Take 5 mL by mouth 4 (Four) Times a Day As Needed for Cough for up to 6 days.  Dispense: 120 mL; Refill: 0    2. Strep throat  Assessment & Plan:  Zpack, f/u if sx worsen or persist or has new sx such as rash.    Orders:  -     JOVI FLU + SARS PCR  -     POC Rapid Strep A  -      azithromycin (Zithromax Z-Matt) 250 MG tablet; Take 2 tablets by mouth on day 1, then 1 tablet daily on days 2-5  Dispense: 6 tablet; Refill: 0    3. Moderate persistent asthma without complication  Assessment & Plan:  Asthma is uncontrolled, he did well on Trelegy but ins did not cover so we rx Breo, he preferred Trelegy.   Tried and failed Breo and Advair.        Orders:  -     Fluticasone-Umeclidin-Vilant (Trelegy Ellipta) 100-62.5-25 MCG/ACT inhaler; Inhale 1 puff Daily.  Dispense: 1 each; Refill: 0          Follow Up   Return if symptoms worsen or fail to improve.    Follow up if symptoms worsen or persist or has new or concerning symptoms, go to ER for severe symptoms.   I discussed my findings, recommendations, and plan of care with the patient. Reviewed common medication effects and side effects and to report side effects immediately. Encouraged medication compliance and the importance of keeping scheduled follow up appointments. Pt verbalized understanding and agreement.  If a referral was made please contact our office if you have not heard about an appointment in the next 2 weeks.   If labs or images are ordered we will contact you with the results within the next week.  If you have not heard from us after a week please call our office to inquire about the results.     I have reviewed the limitations of a telehealth visit (such as lack of a physical exam and unable to obtain vital signs) and advised the patient that they may need to follow up for an office visit should their symptoms or concerns persist, worsen, or change.    Caitlin Tyson PA-C    * Please note that portions of this note were completed with a voice recognition program.

## 2022-10-31 NOTE — ASSESSMENT & PLAN NOTE
You have tested positive for Covid. You should isolate for 10 days after the date your symptoms started.  You may end isolation after day 5 if your symptoms completely resolve but must continue to wear a mask around others for the full 10 days. Please go to ER if you have shortness of breath. Please let me know if your symptoms worsen or persist.

## 2022-12-13 ENCOUNTER — OFFICE VISIT (OUTPATIENT)
Dept: INTERNAL MEDICINE | Facility: CLINIC | Age: 22
End: 2022-12-13

## 2022-12-13 VITALS
BODY MASS INDEX: 28.67 KG/M2 | DIASTOLIC BLOOD PRESSURE: 82 MMHG | HEART RATE: 92 BPM | WEIGHT: 204.8 LBS | HEIGHT: 71 IN | RESPIRATION RATE: 16 BRPM | OXYGEN SATURATION: 97 % | TEMPERATURE: 98.7 F | SYSTOLIC BLOOD PRESSURE: 126 MMHG

## 2022-12-13 DIAGNOSIS — R11.0 NAUSEA: ICD-10-CM

## 2022-12-13 DIAGNOSIS — R10.30 LOWER ABDOMINAL PAIN: Primary | ICD-10-CM

## 2022-12-13 DIAGNOSIS — R10.9 LEFT FLANK PAIN: ICD-10-CM

## 2022-12-13 DIAGNOSIS — B34.9 VIRAL ILLNESS: ICD-10-CM

## 2022-12-13 LAB
BILIRUB BLD-MCNC: NEGATIVE MG/DL
CLARITY, POC: CLEAR
COLOR UR: ABNORMAL
EXPIRATION DATE: ABNORMAL
GLUCOSE UR STRIP-MCNC: NEGATIVE MG/DL
KETONES UR QL: NEGATIVE
LEUKOCYTE EST, POC: NEGATIVE
Lab: ABNORMAL
NITRITE UR-MCNC: NEGATIVE MG/ML
PH UR: 5.5 [PH] (ref 5–8)
PROT UR STRIP-MCNC: ABNORMAL MG/DL
RBC # UR STRIP: NEGATIVE /UL
SP GR UR: 1.03 (ref 1–1.03)
UROBILINOGEN UR QL: NORMAL

## 2022-12-13 PROCEDURE — 99214 OFFICE O/P EST MOD 30 MIN: CPT | Performed by: FAMILY MEDICINE

## 2022-12-13 RX ORDER — DICYCLOMINE HCL 20 MG
20 TABLET ORAL EVERY 6 HOURS
Qty: 40 TABLET | Refills: 2 | Status: SHIPPED | OUTPATIENT
Start: 2022-12-13

## 2022-12-13 NOTE — PROGRESS NOTES
"    Office Note     Name: Konrad Aquino    : 2000     MRN: [id number]     Chief Complaint  Abdominal Pain (Ongoing for the last 2-3 days, comes and goes, located in the middle of the abdomen and moves to the left side around to the back, pain with bending and breathing and picking up items )    Subjective     History of Present Illness:  Konrad Aquino is a 22 y.o. male who presents today for several concerns.  Patient states for the last 2 to 3 days he has had a vague abdominal discomfort in the mid abdomen that sometimes radiates around to the left flank and it comes and goes and is not severe but annoying and he occasionally feels a little queasiness with it and it has not affected his bowel habits and has not affected his appetite and he initially thought it was a pulled muscle and he usually leans forward and presses in on his abdomen when he has the discomfort and that helps it a little bit and then it will go away and he states it is intermittent in nature and never severe.  He still has his gallbladder and appendix.  He has had no blood in the stool.    Review of Systems   Respiratory: Negative for cough, shortness of breath and wheezing.    Cardiovascular: Negative for chest pain and palpitations.   Gastrointestinal: Negative for blood in stool, diarrhea and vomiting.   Genitourinary: Negative for dysuria and genital sores.       Objective     Vital Signs  /82   Pulse 92   Temp 98.7 °F (37.1 °C) (Temporal)   Resp 16   Ht 180.3 cm (70.98\") Comment: pt reported  Wt 92.9 kg (204 lb 12.8 oz)   SpO2 97%   BMI 28.58 kg/m²   Estimated body mass index is 28.58 kg/m² as calculated from the following:    Height as of this encounter: 180.3 cm (70.98\").    Weight as of this encounter: 92.9 kg (204 lb 12.8 oz).          Physical Exam  Vitals and nursing note reviewed.   HENT:      Head: Normocephalic and atraumatic.   Neck:      Vascular: No carotid bruit.   Cardiovascular:      Rate and " Rhythm: Normal rate and regular rhythm.      Heart sounds: Normal heart sounds. No murmur heard.    No gallop.   Pulmonary:      Effort: Pulmonary effort is normal. No respiratory distress.      Breath sounds: No stridor. No wheezing, rhonchi or rales.   Abdominal:      General: Abdomen is flat. Bowel sounds are normal.      Palpations: Abdomen is soft.      Tenderness: There is abdominal tenderness in the left lower quadrant.      Hernia: No hernia is present.   Musculoskeletal:      Cervical back: Normal range of motion and neck supple.      Right lower leg: No edema.      Left lower leg: No edema.   Lymphadenopathy:      Cervical: No cervical adenopathy.   Neurological:      Mental Status: He is alert.                 Assessment and Plan     Diagnoses and all orders for this visit:    1. Lower abdominal pain (Primary)  -     dicyclomine (BENTYL) 20 MG tablet; Take 1 tablet by mouth Every 6 (Six) Hours.  Dispense: 40 tablet; Refill: 2    2. Left flank pain    3. Nausea    4. Viral illness--supportive care    rtc or to ER with any worsening over next 2 days    U/a-negative      Follow Up  No follow-ups on file.    Rosangela Casas DO

## 2022-12-14 ENCOUNTER — TELEPHONE (OUTPATIENT)
Dept: INTERNAL MEDICINE | Facility: CLINIC | Age: 22
End: 2022-12-14

## 2022-12-14 NOTE — TELEPHONE ENCOUNTER
PATIENT HAS CALLED AND STATED HE IS CONCERNED WITH HIS TEST RESULTS FROM YESTERDAY AND IS REQUESTING A REFERRAL TO SEE A UROLOGIST. PATIENT STATES RESULTS HAD SHOWN TRACE AMOUNT OF PROTEIN IN URINE WHICH HAS BEEN GOING ON FOR A LONG WHILE.  PATIENT IS REQUESTING A CALL BACK EITHER WAY WITH STATUS OF REFERRAL.    CALL BACK NUMBER [phone]

## 2022-12-19 DIAGNOSIS — R80.9 PROTEINURIA, UNSPECIFIED TYPE: Primary | ICD-10-CM

## 2022-12-19 NOTE — TELEPHONE ENCOUNTER
He states if Dr. Casas is not concerned then he does not need a referral at this time. He will call back if symptoms persist or worsen to go to Nephrologist.

## 2022-12-19 NOTE — TELEPHONE ENCOUNTER
"It is not a urologist you would see for this but a nephrologist---typically it may not be unusual to see a \"TRACE\" AMOUNT OR TINY AMOUNT OF PROTEIN IN ONE'S BLOOD DEPENDING on their activity level prior to the urine specimen.  Typically of no concern---if he would like to see a nephrologist--an order will be put in the chart for him.  "

## 2023-01-05 ENCOUNTER — OFFICE VISIT (OUTPATIENT)
Dept: INTERNAL MEDICINE | Facility: CLINIC | Age: 23
End: 2023-01-05
Payer: COMMERCIAL

## 2023-01-05 ENCOUNTER — HOSPITAL ENCOUNTER (OUTPATIENT)
Dept: GENERAL RADIOLOGY | Facility: HOSPITAL | Age: 23
Discharge: HOME | End: 2023-01-05
Admitting: PHYSICIAN ASSISTANT
Payer: COMMERCIAL

## 2023-01-05 VITALS
TEMPERATURE: 97.3 F | HEART RATE: 93 BPM | WEIGHT: 210 LBS | HEIGHT: 71 IN | OXYGEN SATURATION: 97 % | DIASTOLIC BLOOD PRESSURE: 74 MMHG | BODY MASS INDEX: 29.4 KG/M2 | SYSTOLIC BLOOD PRESSURE: 126 MMHG | RESPIRATION RATE: 20 BRPM

## 2023-01-05 DIAGNOSIS — M79.641 RIGHT HAND PAIN: Primary | ICD-10-CM

## 2023-01-05 DIAGNOSIS — M79.641 RIGHT HAND PAIN: ICD-10-CM

## 2023-01-05 PROCEDURE — 99213 OFFICE O/P EST LOW 20 MIN: CPT | Performed by: PHYSICIAN ASSISTANT

## 2023-01-05 PROCEDURE — 73130 X-RAY EXAM OF HAND: CPT

## 2023-01-05 NOTE — PROGRESS NOTES
Chief Complaint  Hand Pain (Right hand )    Subjective          History of Present Illness  Konrad Aquino presents to [facility] for   History of Present Illness  Right Hand Pain:  1.5-2 wks ago he injured his thumb. It bent backwards or to the side, some way it was not supposed to bend. It was swollen around his thumb and bruised but that has improved. He still has limited ROM of his thumb. No pain with sitting still but does have pain when he moves his thumb. It won't completely straighten out and he cannot make a fist properly. He is worried he broke it. When it first happened he could not move it due to pain. Would like an xray. Sx had been improving for the first few days but now for the last week it has not gotten any better.       The following portions of the patient's history were reviewed and updated as appropriate: allergies, current medications, past family history, past medical history, past social history, past surgical history and problem list.  Allergies   Allergen Reactions   • Flonase [Fluticasone] Other (See Comments)     Panic attacks   • Prednisone Anxiety   • Latex Rash   • Penicillins Rash   • Singulair [Montelukast] Dizziness       Current Outpatient Medications:   •  albuterol sulfate  (90 Base) MCG/ACT inhaler, Inhale 2 puffs Every 4 (Four) Hours As Needed for Wheezing., Disp: 18 g, Rfl: 3  •  dicyclomine (BENTYL) 20 MG tablet, Take 1 tablet by mouth Every 6 (Six) Hours., Disp: 40 tablet, Rfl: 2  •  Fluticasone-Umeclidin-Vilant (Trelegy Ellipta) 100-62.5-25 MCG/ACT inhaler, Inhale 1 puff Daily., Disp: 1 each, Rfl: 0  •  loratadine-pseudoephedrine (Claritin-D 24 Hour)  MG per 24 hr tablet, Take 1 tablet by mouth Daily., Disp: 30 tablet, Rfl: 2  •  olopatadine (PATANOL) 0.1 % ophthalmic solution, INSTILL 1 DROP INTO AFFECTED EYES TWICE DAILY AS NEEDED FOR ALLERGIES, Disp: , Rfl:   No orders of the defined types were placed in this  encounter.    Social History     Tobacco Use   Smoking Status Former   • Packs/day: 0.00   • Years: 1.00   • Pack years: 0.00   • Types: Cigarettes   • Start date: 2019   • Quit date: 9/10/2020   • Years since quittin.3   Smokeless Tobacco Never   Tobacco Comments    Mostly vaped but did smoke some cigarettes        Objective   Vital Signs:   Vitals:    23 1516   BP: 126/74   Pulse: 93   Resp: 20   Temp: 97.3 °F (36.3 °C)   TempSrc: Temporal   SpO2: 97%   Weight: 95.3 kg (210 lb)   Height: 180.3 cm (70.98\")   PainSc:   6   PainLoc: Hand  Comment: when moving      Physical Exam  Vitals reviewed.   Constitutional:       General: He is not in acute distress.     Appearance: Normal appearance.   HENT:      Head: Normocephalic and atraumatic.   Eyes:      General: No scleral icterus.     Extraocular Movements: Extraocular movements intact.      Conjunctiva/sclera: Conjunctivae normal.   Cardiovascular:      Rate and Rhythm: Normal rate and regular rhythm.      Heart sounds: Normal heart sounds. No murmur heard.  Pulmonary:      Effort: Pulmonary effort is normal. No respiratory distress.      Breath sounds: Normal breath sounds. No stridor. No wheezing or rhonchi.   Musculoskeletal:         General: Tenderness and signs of injury present.      Cervical back: Normal range of motion and neck supple.      Comments: Tenderness over right first MCP joint, no bruising or swelling noted   Skin:     General: Skin is warm and dry.      Coloration: Skin is not jaundiced.      Findings: No bruising.   Neurological:      General: No focal deficit present.      Mental Status: He is alert and oriented to person, place, and time.      Gait: Gait normal.   Psychiatric:         Mood and Affect: Mood normal.         Behavior: Behavior normal.        No LMP for male patient.    Result Review :                   Assessment and Plan    Diagnoses and all orders for this visit:    1. Right hand pain (Primary)  Assessment &  Plan:  Will order xray to eval for fracture/avulsion fracture. Consider ortho referral d/t weakness and continued pain.   NSAIDs/tylenol prn pain    Orders:  -     XR Hand 3+ View Right; Future      Follow Up   Return if symptoms worsen or fail to improve.    Follow up if symptoms worsen or persist or has new or concerning symptoms, go to ER for severe symptoms.   Reviewed common medication effects and side effects and advised to report side effects immediately.  Encouraged medication compliance and the importance of keeping scheduled follow up appointments with me and any other providers.  If a referral was made please contact our office if you have not heard about an appointment in the next 2 weeks.   If labs or images are ordered we will contact you with the results within the next week.  If you have not heard from us after a week please call our office to inquire about the results.   Patient was given instructions and counseling regarding his condition or for health maintenance advice. Please see specific information pulled into the AVS if appropriate.     Caitlin Tyson PA-C    * Please note that portions of this note were completed with a voice recognition program.

## 2023-01-06 DIAGNOSIS — M25.541 PAIN IN THUMB JOINT WITH MOVEMENT OF RIGHT HAND: Primary | ICD-10-CM

## 2023-01-08 PROBLEM — M79.641 RIGHT HAND PAIN: Status: ACTIVE | Noted: 2023-01-08

## 2023-01-09 NOTE — ASSESSMENT & PLAN NOTE
Will order xray to eval for fracture/avulsion fracture. Consider ortho referral d/t weakness and continued pain.   NSAIDs/tylenol prn pain

## 2023-01-17 ENCOUNTER — OFFICE VISIT (OUTPATIENT)
Dept: ORTHOPEDIC SURGERY | Facility: CLINIC | Age: 23
End: 2023-01-17
Payer: COMMERCIAL

## 2023-01-17 VITALS
SYSTOLIC BLOOD PRESSURE: 122 MMHG | WEIGHT: 210 LBS | HEIGHT: 71 IN | BODY MASS INDEX: 29.4 KG/M2 | DIASTOLIC BLOOD PRESSURE: 70 MMHG

## 2023-01-17 DIAGNOSIS — M79.644 PAIN OF RIGHT THUMB: Primary | ICD-10-CM

## 2023-01-17 DIAGNOSIS — S66.211A STRAIN OF EXTENSOR MUSCLE, FASCIA AND TENDON OF RIGHT THUMB AT WRIST AND HAND LEVEL, INITIAL ENCOUNTER: ICD-10-CM

## 2023-01-17 PROCEDURE — 99213 OFFICE O/P EST LOW 20 MIN: CPT | Performed by: PHYSICIAN ASSISTANT

## 2023-01-17 NOTE — PROGRESS NOTES
[facility] Note    Subjective     Chief Complaint   Patient presents with   • Right Hand - Pain        HPI  Konrad Aquino is a 22 y.o. male.  Right-hand-dominant.  New patient presents for evaluation of right thumb pain.  Symptoms/pain have been ongoing for about a month.  JAMAL: Patient reports it bent backwards causing increased pain along the dorsal radial aspect of the thumb.  After the injury he had an inability to move the thumb, limited range of motion.  Since the injury he has noticed that his symptoms have slowly improved.    Pain scale: 0/10 when not using thumb but increases to 4/10 when using.  Severity of the pain none to mild/moderate but improving.  Quality of the pain throbbing, shooting depending on activity.  Associated symptoms initially swelling was present but that is improved, now main complaint is stiffness.  Activity related to pain gripping, grasping activities.  Pain eased by resting, ice, heat, medication.  No reported numbness or tingling.  Prior treatments thumb brace.    History of right fifth metacarpal boxer's fracture treated with closed reduction perc pinning when he was 15 years old.    Denies fever, chills, night sweats or other constitutional symptoms.      Past Medical History:   Diagnosis Date   • Acid reflux    • Asthma    • Difficulty walking 1/1/2010    Been a probelm since i was a child   • Migraine 1/1/2010    Iv had migraines since childhood occasionly      Past Surgical History:   Procedure Laterality Date   • HAND SURGERY     • WISDOM TOOTH EXTRACTION  01/2022      Family History   Problem Relation Age of Onset   • ADD / ADHD Mother    • Anxiety disorder Mother    • Stroke Maternal Grandfather    • Cancer Maternal Grandfather      Social History     Socioeconomic History   • Marital status: Single   Tobacco Use   • Smoking status: Former     Packs/day: 0.00     Years: 1.00     Pack years: 0.00     Types: Cigarettes     Start date: 8/21/2019      Quit date: 9/10/2020     Years since quittin.3   • Smokeless tobacco: Never   • Tobacco comments:     Mostly vaped but did smoke some cigarettes   Vaping Use   • Vaping Use: Former   • Substances: Nicotine   • Devices: Refillable tank   Substance and Sexual Activity   • Alcohol use: Not Currently   • Drug use: Never   • Sexual activity: Yes     Partners: Female     Birth control/protection: Condom      Current Outpatient Medications on File Prior to Visit   Medication Sig Dispense Refill   • albuterol sulfate  (90 Base) MCG/ACT inhaler Inhale 2 puffs Every 4 (Four) Hours As Needed for Wheezing. 18 g 3   • dicyclomine (BENTYL) 20 MG tablet Take 1 tablet by mouth Every 6 (Six) Hours. 40 tablet 2   • Fluticasone-Umeclidin-Vilant (Trelegy Ellipta) 100-62.5-25 MCG/ACT inhaler Inhale 1 puff Daily. 1 each 0   • loratadine-pseudoephedrine (Claritin-D 24 Hour)  MG per 24 hr tablet Take 1 tablet by mouth Daily. 30 tablet 2   • olopatadine (PATANOL) 0.1 % ophthalmic solution INSTILL 1 DROP INTO AFFECTED EYES TWICE DAILY AS NEEDED FOR ALLERGIES       No current facility-administered medications on file prior to visit.      Allergies   Allergen Reactions   • Flonase [Fluticasone] Other (See Comments)     Panic attacks   • Prednisone Anxiety   • Latex Rash   • Penicillins Rash   • Singulair [Montelukast] Dizziness        The following portions of the patient's history were reviewed and updated as appropriate: allergies, current medications, past family history, past medical history, past social history, past surgical history and problem list.    Review of Systems   Constitutional: Negative.    HENT: Negative.    Eyes: Negative.    Respiratory: Negative.    Cardiovascular: Negative.    Gastrointestinal: Negative.    Endocrine: Negative.    Genitourinary: Negative.    Musculoskeletal: Positive for arthralgias.   Skin: Negative.    Allergic/Immunologic: Negative.    Neurological: Negative.    Hematological:  "Negative.    Psychiatric/Behavioral: Negative.         Objective      Physical Exam  /70   Ht 180.3 cm (70.98\")   Wt 95.3 kg (210 lb)   BMI 29.30 kg/m²     Body mass index is 29.3 kg/m².    GENERAL APPEARANCE: awake, alert & oriented x 3, in no acute distress and well developed, well nourished  PSYCH: normal mood and affect  LUNGS:  breathing nonlabored, no wheezing  EYES: sclera anicteric, pupils equal  CARDIOVASCULAR: palpable pulses. Capillary refill less than 2 seconds  INTEGUMENTARY: skin intact, no clubbing, cyanosis  NEUROLOGIC:  Normal Sensation        Ortho Exam  Right thumb  Skin: Grossly intact without any redness, warmth.  Minimal soft tissue swelling noted.  Incisions from previous perc pinning to fifth metacarpal well-healed without redness, warmth or swelling.  Tenderness: Minimal tenderness noted dorsal radial aspect of the thumb along first dorsal compartment.  No other pain noted to the remainder of wrist or hand.    Alignment, rotation appears satisfactory.  Flexor and extensor tendons of the thumb are intact (APL, EPL).  Motor: Grossly intact R/U/M/AIN/PIN  Sensory: Grossly intact LT: R/U/M  Vascular: Brisk capillary refill, 2+ radial pulse.      Imaging/Studies  Reviewed plain film imaging performed on 1/5/2023 patient's right thumb.  XR HAND 3+ VW RIGHT     Date of Exam: 1/5/2023 4:10 PM EST     Indication: right thumb pain.     Comparison: None available.     Bone FINDINGS:    No fracture is identified. Mineralization and alignment appear within normal limits.  Soft tissues appear unremarkable.     IMPRESSION:  No radiographic findings of acute osseous abnormality.      Electronically Signed: Cliff Bartlett    1/5/2023 6:35 PM EST    Workstation ID: [id number]    Assessment/Plan        ICD-10-CM ICD-9-CM   1. Pain of right thumb  M79.644 729.5   2. Strain of extensor muscle, fascia and tendon of right thumb at wrist and hand level, initial encounter  S66.211A 842.10       No orders of the " defined types were placed in this encounter.       -Pain right thumb due to strain of extensor tendons--symptoms are improving.  -Reviewed imaging--no evidence of fracture.  -Patient provided home hand exercises.  -Offered referral to formal PT but patient politely declined stating he can do exercises on his own and symptoms are slowly improving.  -Recommend OTC NSAIDs/pain medication as needed.  -Follow up as needed.  -Questions and concerns answered.      Medical Decision Making  Management Options : over-the-counter medicine  Data/Risk: radiology tests       Margo Landers PA-C  01/20/23  07:58 EST               EMR Dragon/Transcription disclaimer:  Much of this encounter note is an electronic transcription of spoken language to printed text. Electronic transcription of spoken language may permit erroneous, or at times, nonsensical words or phrases to be inadvertently transcribed. Although I have reviewed the note for such errors, some may still exist.

## 2023-02-17 ENCOUNTER — OFFICE VISIT (OUTPATIENT)
Dept: INTERNAL MEDICINE | Facility: CLINIC | Age: 23
End: 2023-02-17
Payer: COMMERCIAL

## 2023-02-17 VITALS
BODY MASS INDEX: 29.31 KG/M2 | HEIGHT: 71 IN | SYSTOLIC BLOOD PRESSURE: 120 MMHG | HEART RATE: 89 BPM | RESPIRATION RATE: 20 BRPM | TEMPERATURE: 99 F | OXYGEN SATURATION: 96 % | DIASTOLIC BLOOD PRESSURE: 78 MMHG

## 2023-02-17 DIAGNOSIS — U07.1 COVID-19 VIRUS INFECTION: ICD-10-CM

## 2023-02-17 DIAGNOSIS — J02.9 SORE THROAT: Primary | ICD-10-CM

## 2023-02-17 DIAGNOSIS — R53.83 OTHER FATIGUE: ICD-10-CM

## 2023-02-17 DIAGNOSIS — R09.81 SINUS CONGESTION: ICD-10-CM

## 2023-02-17 LAB
EXPIRATION DATE: ABNORMAL
EXPIRATION DATE: NORMAL
FLUAV RNA RESP QL NAA+PROBE: NOT DETECTED
FLUBV RNA RESP QL NAA+PROBE: NOT DETECTED
INTERNAL CONTROL: ABNORMAL
INTERNAL CONTROL: NORMAL
Lab: ABNORMAL
Lab: NORMAL
S PYO AG THROAT QL: NEGATIVE
SARS-COV-2 RNA RESP QL NAA+PROBE: DETECTED

## 2023-02-17 PROCEDURE — 87880 STREP A ASSAY W/OPTIC: CPT | Performed by: FAMILY MEDICINE

## 2023-02-17 PROCEDURE — 99212 OFFICE O/P EST SF 10 MIN: CPT | Performed by: FAMILY MEDICINE

## 2023-02-17 NOTE — PROGRESS NOTES
"    Office Note     Name: Konrad Aquino    : 2000     MRN: [id number]     Chief Complaint  Sore Throat and post nasal drip (Nasal congestion/)    Subjective     History of Present Illness:  Konrad Aquino is a 22 y.o. male who presents today for several concerns.  He states that yesterday and today he has had sore throat and sinus drainage and not feeling well but he feels better today.  He has had the COVID vaccines and boosters and he had the COVID illness about 3 months ago and has some prescription cough medicine left at home for that.  He lives at home with his girlfriend and she has not been sick.  His throat is still sore.    Review of Systems   Respiratory: Negative for cough, shortness of breath and wheezing.    Cardiovascular: Negative for chest pain and palpitations.   Gastrointestinal: Negative for blood in stool, diarrhea and vomiting.   Genitourinary: Negative for dysuria, flank pain and genital sores.       Objective     Vital Signs  /78 (Cuff Size: Adult)   Pulse 89   Temp 99 °F (37.2 °C) (Infrared)   Resp 20   Ht 180.3 cm (70.98\")   SpO2 96%   BMI 29.31 kg/m²   Estimated body mass index is 29.31 kg/m² as calculated from the following:    Height as of this encounter: 180.3 cm (70.98\").    Weight as of 23: 95.3 kg (210 lb).          Physical Exam  Vitals and nursing note reviewed.   HENT:      Head: Normocephalic and atraumatic.   Neck:      Vascular: No carotid bruit.   Cardiovascular:      Rate and Rhythm: Normal rate and regular rhythm.      Heart sounds: Normal heart sounds. No murmur heard.    No gallop.   Pulmonary:      Effort: Pulmonary effort is normal. No respiratory distress.      Breath sounds: No stridor. No wheezing, rhonchi or rales.   Musculoskeletal:      Cervical back: Normal range of motion and neck supple.      Right lower leg: No edema.      Left lower leg: No edema.   Lymphadenopathy:      Cervical: No cervical adenopathy.   Neurological:      " Mental Status: He is alert.                 Assessment and Plan     Diagnoses and all orders for this visit:    1. Sore throat (Primary)  -     POCT rapid strep A  -     Covid-19 + Flu A&B AG, Veritor    2. Sinus congestion  -     Covid-19 + Flu A&B AG, Veritor    3. Other fatigue    4. COVID-19 virus infection--symptomatic treatment and go to ER with any cp, sob, high fever or overall decline---given note for work and discussed isolation precautions        Follow Up  Return if symptoms worsen or fail to improve, for F/U with Caitlin.    Rosangela Casas, DO

## 2023-07-31 ENCOUNTER — OFFICE VISIT (OUTPATIENT)
Dept: INTERNAL MEDICINE | Facility: CLINIC | Age: 23
End: 2023-07-31
Payer: COMMERCIAL

## 2023-07-31 VITALS
RESPIRATION RATE: 18 BRPM | BODY MASS INDEX: 27.83 KG/M2 | WEIGHT: 198.8 LBS | HEART RATE: 79 BPM | TEMPERATURE: 97.8 F | DIASTOLIC BLOOD PRESSURE: 80 MMHG | OXYGEN SATURATION: 96 % | HEIGHT: 71 IN | SYSTOLIC BLOOD PRESSURE: 132 MMHG

## 2023-07-31 DIAGNOSIS — Z13.220 SCREENING, LIPID: ICD-10-CM

## 2023-07-31 DIAGNOSIS — K21.9 GASTROESOPHAGEAL REFLUX DISEASE, UNSPECIFIED WHETHER ESOPHAGITIS PRESENT: Primary | ICD-10-CM

## 2023-07-31 DIAGNOSIS — G89.29 CHRONIC PAIN OF BOTH KNEES: ICD-10-CM

## 2023-07-31 DIAGNOSIS — R42 DIZZINESS: ICD-10-CM

## 2023-07-31 DIAGNOSIS — M25.561 CHRONIC PAIN OF BOTH KNEES: ICD-10-CM

## 2023-07-31 DIAGNOSIS — M25.562 ACUTE PAIN OF LEFT KNEE: ICD-10-CM

## 2023-07-31 DIAGNOSIS — R80.9 PROTEINURIA, UNSPECIFIED TYPE: ICD-10-CM

## 2023-07-31 DIAGNOSIS — M25.562 CHRONIC PAIN OF BOTH KNEES: ICD-10-CM

## 2023-07-31 DIAGNOSIS — J45.40 MODERATE PERSISTENT ASTHMA WITHOUT COMPLICATION: ICD-10-CM

## 2023-07-31 PROCEDURE — 99214 OFFICE O/P EST MOD 30 MIN: CPT | Performed by: PHYSICIAN ASSISTANT

## 2023-07-31 PROCEDURE — 1159F MED LIST DOCD IN RCRD: CPT | Performed by: PHYSICIAN ASSISTANT

## 2023-07-31 PROCEDURE — 1160F RVW MEDS BY RX/DR IN RCRD: CPT | Performed by: PHYSICIAN ASSISTANT

## 2023-07-31 RX ORDER — FLUTICASONE PROPIONATE AND SALMETEROL 100; 50 UG/1; UG/1
POWDER RESPIRATORY (INHALATION)
COMMUNITY

## 2023-07-31 RX ORDER — OMEPRAZOLE 20 MG/1
20 CAPSULE, DELAYED RELEASE ORAL DAILY
Qty: 30 CAPSULE | Refills: 2 | Status: SHIPPED | OUTPATIENT
Start: 2023-07-31

## 2023-07-31 NOTE — PROGRESS NOTES
Chief Complaint  Abdominal Pain (Ever since he changed his diet he has some problems ), Knee Pain, and Asthma    Subjective          History of Present Illness  Konrad Aquino presents to [facility] for   History of Present Illness  Abd Pain:  Has had stomach pain when he wakes up in the morning, sx started after he changed his diet and started hello fresh. He is no longer having BMs as much as he was. Has been doing the new diet over the last 2 weeks. He does have nausea but reports that is a chronic problem. He tried an acid reflux med this morning but not sure if it helped. Has used Prilosec in the past and it helped.    Proteinuria:  Had protein in his urine in the past and would like to see if that is still there. He does not have any blood in his urine or pain when he pees now but has had episodes of dysuria in the past.     Dizziness:  Has some dizziness when he bends forward and then stands back up. Like the blood rushes to his head. He has not passed out. It is getting worse, he will notice it almost daily now. It lasts for a few seconds.     Asthma/AR:  He feels like his allergies have improved some and are well controlled with claritin. But he wants to see someone about his asthma. He is using Advair discus and that helps. If he stops using it regularly he will start having nighttime cough/wheeze, and wheeze/soa with exercise. Had been going weeks in between using albuterol but is using it more often lately. No recent illness, no fevers, he has had wheeze here and there.     Bilat knee pain:  He has to stop working out with elliptical or treadmill due to pain in his knees, this happens before he gets out of breath, only 15 minutes. He has been using the bicycle and that doesn't bother his knees as much. Knees do not swell up or turn red. Knees are stiff and painful in the mornings. He has had worsening sx in his left knee compared to normal. Knees feel tight like he  "cannot get full range of motion.   He has had worsening pain in knees the last 3-4 weeks since he started going to the gym. Not so bad that he has to take med for pain.     The following portions of the patient's history were reviewed and updated as appropriate: allergies, current medications, past family history, past medical history, past social history, past surgical history and problem list.  Allergies   Allergen Reactions    Flonase [Fluticasone] Other (See Comments)     Panic attacks    Prednisone Anxiety    Latex Rash    Penicillins Rash    Singulair [Montelukast] Dizziness       Current Outpatient Medications:     albuterol sulfate  (90 Base) MCG/ACT inhaler, Inhale 2 puffs Every 4 (Four) Hours As Needed for Wheezing., Disp: 18 g, Rfl: 3    Fluticasone-Salmeterol (ADVAIR/WIXELA) 100-50 MCG/ACT DISKUS, Inhale 2 (Two) Times a Day., Disp: , Rfl:     Fluticasone-Umeclidin-Vilant (Trelegy Ellipta) 100-62.5-25 MCG/ACT inhaler, Inhale 1 puff Daily. (Patient not taking: Reported on 7/31/2023), Disp: 1 each, Rfl: 0    omeprazole (priLOSEC) 20 MG capsule, Take 1 capsule by mouth Daily., Disp: 30 capsule, Rfl: 2  New Medications Ordered This Visit   Medications    omeprazole (priLOSEC) 20 MG capsule     Sig: Take 1 capsule by mouth Daily.     Dispense:  30 capsule     Refill:  2     Social History     Tobacco Use   Smoking Status Every Day    Types: Electronic Cigarette   Smokeless Tobacco Never   Tobacco Comments    Mostly vaped but did smoke some cigarettes        Objective   Vital Signs:   Vitals:    07/31/23 1632   BP: 132/80   BP Location: Left arm   Patient Position: Sitting   Pulse: 79   Resp: 18   Temp: 97.8 øF (36.6 øC)   SpO2: 96%   Weight: 90.2 kg (198 lb 12.8 oz)   Height: 180.3 cm (70.98\")   PainSc:   2   PainLoc: Knee      Body mass index is 27.74 kg/mý.  Physical Exam  Vitals reviewed.   Constitutional:       General: He is not in acute distress.     Appearance: Normal appearance.   HENT:      " Head: Normocephalic and atraumatic.   Eyes:      General: No scleral icterus.     Extraocular Movements: Extraocular movements intact.      Conjunctiva/sclera: Conjunctivae normal.   Cardiovascular:      Rate and Rhythm: Normal rate and regular rhythm.      Heart sounds: Normal heart sounds. No murmur heard.  Pulmonary:      Effort: Pulmonary effort is normal. No respiratory distress.      Breath sounds: Normal breath sounds. No stridor. No wheezing or rhonchi.   Abdominal:      General: Bowel sounds are normal.      Palpations: Abdomen is soft.      Tenderness: There is no abdominal tenderness.   Musculoskeletal:      Cervical back: Normal range of motion and neck supple.   Skin:     General: Skin is warm and dry.      Coloration: Skin is not jaundiced.   Neurological:      General: No focal deficit present.      Mental Status: He is alert and oriented to person, place, and time.      Gait: Gait normal.   Psychiatric:         Mood and Affect: Mood normal.         Behavior: Behavior normal.      No LMP for male patient.        Result Review :                   Assessment and Plan    Diagnoses and all orders for this visit:    1. Gastroesophageal reflux disease, unspecified whether esophagitis present (Primary)  Assessment & Plan:  Chronic, uncontrolled, rx prilosec    Orders:  -     omeprazole (priLOSEC) 20 MG capsule; Take 1 capsule by mouth Daily.  Dispense: 30 capsule; Refill: 2    2. Screening, lipid  -     Lipid Panel; Future    3. Proteinuria, unspecified type  Assessment & Plan:  Check u/a    Orders:  -     Cancel: POC Urinalysis Dipstick, Automated  -     Comprehensive Metabolic Panel; Future  -     CBC Auto Differential; Future  -     POC Urinalysis Dipstick, Automated; Future    4. Acute pain of left knee  Assessment & Plan:  Refer to PT and check xray    Orders:  -     Ambulatory Referral to Physical Therapy Evaluate and treat  -     XR Knee 1 or 2 View Bilateral; Future    5. Chronic pain of both  knees  Assessment & Plan:  Chronic, uncontrolled, refer to PT and check xray, if sx persist/worsen refer to ortho. Nsaids prn    Orders:  -     Ambulatory Referral to Physical Therapy Evaluate and treat  -     XR Knee 1 or 2 View Bilateral; Future    6. Moderate persistent asthma without complication  Assessment & Plan:  Asthma is uncontrolled, cont on Advair at this time, refer to pulmonology, alb prn    Orders:  -     Ambulatory Referral to Pulmonology    7. Dizziness  Assessment & Plan:  Check labs.  Stay well hydrated, do not skip  meals. Monitor for worsening sx          Follow Up   Return if symptoms worsen or fail to improve.    Follow up if symptoms worsen or persist or has new or concerning symptoms, go to ER for severe symptoms.   Reviewed common medication effects and side effects and advised to report side effects immediately.  Encouraged medication compliance and the importance of keeping scheduled follow up appointments with me and any other providers.  If a referral was made please contact our office if you have not heard about an appointment in the next 2 weeks.   If labs or images are ordered we will contact you with the results within the next week.  If you have not heard from us after a week please call our office to inquire about the results.   Patient was given instructions and counseling regarding his condition or for health maintenance advice. Please see specific information pulled into the AVS if appropriate.     Caitlin Tyson PA-C    * Please note that portions of this note were completed with a voice recognition program. Answers submitted by the patient for this visit:  Primary Reason for Visit (Submitted on 7/31/2023)  What is the primary reason for your visit?: Other  Other (Submitted on 7/31/2023)  Please describe your symptoms.: Light headed , knee pain ,  Have you had these symptoms before?: Yes  How long have you been having these symptoms?: Greater than 2 weeks  Please list any  medications you are currently taking for this condition.: Advare discus  Please describe any probable cause for these symptoms. : No idea

## 2023-08-06 PROBLEM — R42 DIZZINESS: Status: ACTIVE | Noted: 2023-08-06

## 2023-08-06 PROBLEM — G89.29 CHRONIC PAIN OF BOTH KNEES: Status: ACTIVE | Noted: 2023-08-06

## 2023-08-06 PROBLEM — R80.9 PROTEINURIA: Status: ACTIVE | Noted: 2023-08-06

## 2023-08-06 PROBLEM — M25.562 ACUTE PAIN OF LEFT KNEE: Status: ACTIVE | Noted: 2023-08-06

## 2023-08-06 PROBLEM — M25.562 CHRONIC PAIN OF BOTH KNEES: Status: ACTIVE | Noted: 2023-08-06

## 2023-08-06 PROBLEM — M25.561 CHRONIC PAIN OF BOTH KNEES: Status: ACTIVE | Noted: 2023-08-06

## 2023-08-06 NOTE — ASSESSMENT & PLAN NOTE
Chronic, uncontrolled, refer to PT and check xray, if sx persist/worsen refer to ortho. Nsaids prn

## 2024-04-26 ENCOUNTER — TELEPHONE (OUTPATIENT)
Dept: INTERNAL MEDICINE | Facility: CLINIC | Age: 24
End: 2024-04-26

## 2024-04-26 NOTE — TELEPHONE ENCOUNTER
Caller: ZUHAIR CHAVEZ    Relationship to patient:  PATIENT    Best call back number:      Chief complaint: PHYSICAL    Type of visit: PHYSICAL    Requested date: TUESDAYS     If rescheduling, when is the original appointment: NA     Additional notes:PATIENT IS SCHEDULED  WITH SHARMILA ON [id number] AS ANGELO HAD NO OPENINGS UNTIL JUNE

## 2024-10-04 ENCOUNTER — OFFICE VISIT (OUTPATIENT)
Dept: INTERNAL MEDICINE | Facility: CLINIC | Age: 24
End: 2024-10-04
Payer: COMMERCIAL

## 2024-10-04 VITALS
HEIGHT: 72 IN | OXYGEN SATURATION: 98 % | HEART RATE: 61 BPM | RESPIRATION RATE: 18 BRPM | WEIGHT: 176 LBS | DIASTOLIC BLOOD PRESSURE: 68 MMHG | TEMPERATURE: 98.6 F | BODY MASS INDEX: 23.84 KG/M2 | SYSTOLIC BLOOD PRESSURE: 116 MMHG

## 2024-10-04 DIAGNOSIS — Z23 NEED FOR TDAP VACCINATION: ICD-10-CM

## 2024-10-04 DIAGNOSIS — Z00.00 ANNUAL PHYSICAL EXAM: Primary | ICD-10-CM

## 2024-10-04 PROBLEM — U07.1 COVID-19 VIRUS INFECTION: Status: RESOLVED | Noted: 2022-10-31 | Resolved: 2024-10-04

## 2024-10-04 PROCEDURE — 90715 TDAP VACCINE 7 YRS/> IM: CPT | Performed by: PHYSICIAN ASSISTANT

## 2024-10-04 PROCEDURE — 99395 PREV VISIT EST AGE 18-39: CPT | Performed by: PHYSICIAN ASSISTANT

## 2024-10-04 PROCEDURE — 90471 IMMUNIZATION ADMIN: CPT | Performed by: PHYSICIAN ASSISTANT

## 2024-10-04 NOTE — PROGRESS NOTES
Male Physical Note      Patient Name: Konrad Aquino  : 2000   MRN: [id number]     Chief Complaint:    Chief Complaint   Patient presents with    Annual Exam       History of Present Illness: Konrad Aquino is a 24 y.o. male who is here today for their annual health maintenance and physical.     No concerns or complaints today.      Subjective         Past Medical History, Social History, Family History and Care Team were all reviewed with patient and updated as appropriate.       Current Outpatient Medications:     albuterol sulfate  (90 Base) MCG/ACT inhaler, Inhale 2 puffs Every 4 (Four) Hours As Needed for Wheezing., Disp: 18 g, Rfl: 3    Fluticasone-Salmeterol (ADVAIR/WIXELA) 100-50 MCG/ACT DISKUS, Inhale 2 (Two) Times a Day., Disp: , Rfl:     omeprazole (priLOSEC) 20 MG capsule, Take 1 capsule by mouth Daily., Disp: 30 capsule, Rfl: 2    Allergies   Allergen Reactions    Flonase [Fluticasone] Other (See Comments)     Panic attacks    Prednisone Anxiety    Latex Rash    Penicillins Rash    Singulair [Montelukast] Dizziness       The ASCVD Risk score (Mark Anthony PAULA, et al., 2019) failed to calculate for the following reasons:    The 2019 ASCVD risk score is only valid for ages 40 to 79    Health Habits:  Eye exam within last 2 years? Will schedule  Dental exam every 6 months? no  Exercise habits: 3-4 times per week  Healthy diet? sometimes     Do you take any herbs or supplements that were not prescribed by a doctor? Magnesium and D3 occasionally  Are you taking calcium supplements? no  Are you taking aspirin daily? no      PHQ-9 Depression Screening  Little interest or pleasure in doing things? 0-->not at all   Feeling down, depressed, or hopeless? 0-->not at all   Trouble falling or staying asleep, or sleeping too much?     Feeling tired or having little energy?     Poor appetite or overeating?     Feeling bad about yourself - or that you are a failure or have let yourself or your family  "down?     Trouble concentrating on things, such as reading the newspaper or watching television?     Moving or speaking so slowly that other people could have noticed? Or the opposite - being so fidgety or restless that you have been moving around a lot more than usual?     Thoughts that you would be better off dead, or of hurting yourself in some way?     PHQ-9 Total Score 0   If you checked off any problems, how difficult have these problems made it for you to do your work, take care of things at home, or get along with other people?         Health Maintenance:   Health Maintenance   Topic Date Due    TDAP/TD VACCINES (1 - Tdap) Never done    HEPATITIS C SCREENING  Never done    Pneumococcal Vaccine 0-64 (2 of 2 - PCV) 04/07/2023    ANNUAL PHYSICAL  04/07/2023    INFLUENZA VACCINE  Never done    COVID-19 Vaccine (4 - 2023-24 season) 09/01/2024          Objective     Physical Exam:  Vital Signs:   Vitals:    10/04/24 1402   BP: 116/68   Pulse: 61   Resp: 18   Temp: 98.6 °F (37 °C)   SpO2: 98%   Weight: 79.8 kg (176 lb)   Height: 182.9 cm (72\")     Body mass index is 23.87 kg/m².   BMI is within normal parameters. No other follow-up for BMI required.      Physical Exam  Vitals and nursing note reviewed.   Constitutional:       General: He is not in acute distress.     Appearance: He is well-developed. He is not ill-appearing, toxic-appearing or diaphoretic.   HENT:      Head: Normocephalic and atraumatic.      Right Ear: Tympanic membrane, ear canal and external ear normal. There is no impacted cerumen.      Left Ear: Tympanic membrane, ear canal and external ear normal. There is no impacted cerumen.      Nose: Nose normal.      Mouth/Throat:      Pharynx: No oropharyngeal exudate.   Eyes:      General: No scleral icterus.     Conjunctiva/sclera: Conjunctivae normal.      Pupils: Pupils are equal, round, and reactive to light.   Neck:      Thyroid: No thyromegaly.   Cardiovascular:      Rate and Rhythm: Normal rate " and regular rhythm.      Heart sounds: Normal heart sounds. No murmur heard.     No friction rub. No gallop.   Pulmonary:      Effort: Pulmonary effort is normal. No respiratory distress.      Breath sounds: Normal breath sounds. No stridor. No wheezing or rales.   Chest:      Chest wall: No tenderness.   Abdominal:      General: Bowel sounds are normal. There is no distension.      Palpations: Abdomen is soft. There is no mass.      Tenderness: There is no abdominal tenderness. There is no right CVA tenderness, left CVA tenderness, guarding or rebound.      Hernia: No hernia is present.   Musculoskeletal:         General: No swelling or tenderness. Normal range of motion.      Cervical back: Normal range of motion and neck supple. No rigidity or tenderness.      Right lower leg: No edema.      Left lower leg: No edema.   Lymphadenopathy:      Cervical: No cervical adenopathy.   Skin:     General: Skin is warm and dry.      Capillary Refill: Capillary refill takes less than 2 seconds.      Coloration: Skin is not pale.      Findings: No rash.   Neurological:      Mental Status: He is alert and oriented to person, place, and time.      Cranial Nerves: No cranial nerve deficit.      Sensory: No sensory deficit.      Coordination: Coordination normal.      Gait: Gait normal.      Deep Tendon Reflexes: Reflexes normal.   Psychiatric:         Mood and Affect: Mood normal.         Behavior: Behavior normal.         Thought Content: Thought content normal.         Judgment: Judgment normal.         Procedures    Assessment / Plan      Assessment/Plan:   Diagnoses and all orders for this visit:    1. Annual physical exam (Primary)    2. Need for Tdap vaccination  -     Tdap Vaccine => 8yo IM (BOOSTRIX/ADACEL)         He will obtain influenza immunization from his pharmacy in 2 weeks.     Follow Up:   No follow-ups on file.    Healthcare Maintenance:   Patient Counseling:  --Nutrition: Stressed importance of moderation in  sodium/caffeine intake, saturated fat and cholesterol, caloric balance, sufficient intake of fresh fruits, vegetables, fiber, calcium and iron.  --Discussed the issue of calcium supplement, and the daily use of baby aspirin if applicable.  --Exercise: Stressed the importance of regular exercise.   --Substance Abuse: Discussed cessation/primary prevention of tobacco (if applicable, alcohol, or other drug use (if applicable); driving or other dangerous activities under the influence; availability of treatment for abuse.    --Sexuality: Discussed sexually transmitted diseases, partner selection, use of condoms, avoidance of unintended pregnancy  and contraceptive alternatives.   --Injury prevention: Discussed safety belts, safety helmets, smoke detector, smoking near bedding or upholstery.   --Dental health: Discussed importance of regular tooth brushing, flossing, and dental visits.  --Immunizations reviewed.  --Discussed benefits of screening colonoscopy (if applicable).  --After hours service discussed with patient.    Sarah Cavazos PA-C  [facility]     Please note that portions of this note may have been completed with a voice recognition program.

## 2024-10-22 ENCOUNTER — TELEPHONE (OUTPATIENT)
Dept: INTERNAL MEDICINE | Facility: CLINIC | Age: 24
End: 2024-10-22
Payer: COMMERCIAL

## 2024-10-22 NOTE — TELEPHONE ENCOUNTER
Caller: Konrad Aquino    Relationship: Self    Best call back number: [phone]     What is the best time to reach you: ANY    Who are you requesting to speak with (clinical staff, provider,  specific staff member): NURSE    Do you know the name of the person who called: PATIENT    What was the call regarding: PATIENT NEEDS WORK NOTE FOR OCT 4TH APPOINTMENT.  UPLOAD TO [employer].  HE WILL PULL FROM LETTERS.      Is it okay if the provider responds through [facility]: CALL IF NEEDED

## 2024-10-22 NOTE — LETTER
October 23, 2024     Patient: Konrad Aquino   YOB: 2000           To Whom It May Concern:    Please excuse Konrad Aquino from work on October 4, 2024.                Sincerely,          Sarah Cavazos PA-C

## 2025-01-27 ENCOUNTER — OFFICE VISIT (OUTPATIENT)
Dept: INTERNAL MEDICINE | Facility: CLINIC | Age: 25
End: 2025-01-27
Payer: COMMERCIAL

## 2025-01-27 VITALS
RESPIRATION RATE: 16 BRPM | BODY MASS INDEX: 24.24 KG/M2 | TEMPERATURE: 98.7 F | SYSTOLIC BLOOD PRESSURE: 131 MMHG | OXYGEN SATURATION: 100 % | HEART RATE: 91 BPM | HEIGHT: 72 IN | DIASTOLIC BLOOD PRESSURE: 72 MMHG | WEIGHT: 179 LBS

## 2025-01-27 DIAGNOSIS — J02.9 SORE THROAT: Primary | ICD-10-CM

## 2025-01-27 DIAGNOSIS — J02.0 STREP PHARYNGITIS: ICD-10-CM

## 2025-01-27 DIAGNOSIS — R05.1 ACUTE COUGH: ICD-10-CM

## 2025-01-27 LAB
EXPIRATION DATE: ABNORMAL
EXPIRATION DATE: NORMAL
FLUAV AG UPPER RESP QL IA.RAPID: NOT DETECTED
FLUBV AG UPPER RESP QL IA.RAPID: NOT DETECTED
INTERNAL CONTROL: ABNORMAL
INTERNAL CONTROL: NORMAL
Lab: ABNORMAL
Lab: NORMAL
S PYO AG THROAT QL: POSITIVE
SARS-COV-2 AG UPPER RESP QL IA.RAPID: NOT DETECTED

## 2025-01-27 PROCEDURE — 87880 STREP A ASSAY W/OPTIC: CPT | Performed by: NURSE PRACTITIONER

## 2025-01-27 PROCEDURE — 87428 SARSCOV & INF VIR A&B AG IA: CPT | Performed by: NURSE PRACTITIONER

## 2025-01-27 PROCEDURE — 99213 OFFICE O/P EST LOW 20 MIN: CPT | Performed by: NURSE PRACTITIONER

## 2025-01-27 RX ORDER — DOXYCYCLINE 100 MG/1
100 TABLET ORAL 2 TIMES DAILY
Qty: 20 TABLET | Refills: 0 | Status: SHIPPED | OUTPATIENT
Start: 2025-01-27 | End: 2025-02-06

## 2025-01-27 NOTE — PROGRESS NOTES
Chief Complaint / Reason:      Chief Complaint   Patient presents with    Cough     X 3-4 days, hurts to cough    Headache     Behind eyes    Sore Throat     Pt states that it is from coughing    Fever     And fatigue       Subjective     History of Present Illness  The patient is a 24-year-old male with a 3-4 day history of cough, headache, sore throat, fever, and fatigue.    He reports a mild headache and productive cough, often swallowing the expectorate. He induced vomiting while trying to expectorate. He was exposed to bronchitis through his girlfriend. He occasionally vapes but does not smoke cigarettes or marijuana.    He had a fever last night and has a history of strep throat. He denies nasal discharge but has body aches. He felt extremely cold while standing, followed by a slight elevation in blood pressure.    SOCIAL HISTORY  He occasionally vapes but does not smoke cigarettes or marijuana. He works at [employer] and is a student at [employer].    History taken from: patient    PMH/FH/Social History were reviewed and updated appropriately in the electronic medical record.   Past Medical History:   Diagnosis Date    Acid reflux     Asthma     Difficulty walking 1/1/2010    Been a probelm since i was a child    Migraine 1/1/2010    Iv had migraines since childhood occasionly     Past Surgical History:   Procedure Laterality Date    HAND SURGERY      WISDOM TOOTH EXTRACTION  01/2022     Social History     Socioeconomic History    Marital status: Single   Tobacco Use    Smoking status: Every Day     Types: Electronic Cigarette    Smokeless tobacco: Never    Tobacco comments:     Mostly vaped but did smoke some cigarettes   Vaping Use    Vaping status: Former    Substances: Nicotine    Devices: Refillable tank   Substance and Sexual Activity    Alcohol use: Not Currently    Drug use: Never    Sexual activity: Yes     Partners: Female     Birth control/protection: Condom     Family History   Problem Relation Age of  Onset    ADD / ADHD Mother     Anxiety disorder Mother     Stroke Maternal Grandfather     Cancer Maternal Grandfather        Review of Systems:   Review of Systems      All other systems were reviewed and are negative.  Exceptions are noted in the subjective or above.      Objective     Vital Signs  Vitals:    01/27/25 1132   BP: 131/72   Pulse: 91   Resp: 16   Temp: 98.7 °F (37.1 °C)   SpO2: 100%       Body mass index is 24.28 kg/m².  BMI is within normal parameters. No other follow-up for BMI required.       Physical Exam  Vitals and nursing note reviewed.   Constitutional:       General: He is not in acute distress.     Appearance: He is well-developed.   HENT:      Head: Normocephalic and atraumatic.      Right Ear: Ear canal and external ear normal. Tympanic membrane is erythematous and bulging.      Left Ear: Ear canal and external ear normal. Tympanic membrane is erythematous and bulging.      Nose: Mucosal edema present. No rhinorrhea.      Right Sinus: No maxillary sinus tenderness or frontal sinus tenderness.      Left Sinus: No maxillary sinus tenderness or frontal sinus tenderness.      Mouth/Throat:      Mouth: Mucous membranes are dry.      Pharynx: Oropharyngeal exudate and posterior oropharyngeal erythema present.      Comments: PND    Eyes:      Conjunctiva/sclera: Conjunctivae normal.   Cardiovascular:      Rate and Rhythm: Normal rate and regular rhythm.      Heart sounds: Normal heart sounds.   Pulmonary:      Effort: Pulmonary effort is normal. No respiratory distress.      Breath sounds: Normal breath sounds.   Lymphadenopathy:      Head:      Right side of head: No submental, submandibular or tonsillar adenopathy.      Left side of head: No submental, submandibular or tonsillar adenopathy.      Cervical: No cervical adenopathy.   Skin:     General: Skin is warm and dry.      Capillary Refill: Capillary refill takes less than 2 seconds.      Findings: No rash.   Neurological:      Mental  Status: He is alert and oriented to person, place, and time.   Psychiatric:         Behavior: Behavior normal.         Thought Content: Thought content normal.         Judgment: Judgment normal.              Results Review:    I reviewed the patient's new clinical results.   Office Visit on 01/27/2025   Component Date Value Ref Range Status    SARS Antigen 01/27/2025 Not Detected  Not Detected, Presumptive Negative Final    Influenza A Antigen [place] 01/27/2025 Not Detected  Not Detected Final    Influenza B Antigen [place] 01/27/2025 Not Detected  Not Detected Final    Internal Control 01/27/2025 Passed  Passed Final    Lot Number 01/27/2025 [id number]   Final    Expiration Date 01/27/2025 11,[id number]   Final    Rapid Strep A Screen 01/27/2025 Positive (A)  Negative, VALID, INVALID, Not Performed Final    Internal Control 01/27/2025 Passed  Passed Final    Lot Number 01/27/2025 [id number]   Final    Expiration Date 01/27/2025 1,[id number]   Final         Medication Review:     Current Outpatient Medications:     doxycycline (ADOXA) 100 MG tablet, Take 1 tablet by mouth 2 (Two) Times a Day for 10 days., Disp: 20 tablet, Rfl: 0    Diagnoses and all orders for this visit:    Sore throat  -     POC Rapid Strep A    Acute cough  -     POCT SARS-CoV-2 + Flu Antigen [place]    Strep pharyngitis  -     doxycycline (ADOXA) 100 MG tablet; Take 1 tablet by mouth 2 (Two) Times a Day for 10 days.      Assessment & Plan  1. strep  - Presents with cough, headache, sore throat, fever, and fatigue  - Blood pressure 131, oxygen saturation normal  - No current fever, but reported one last night  - Throat significantly red  - Advised salt water gargles, monthly toothbrush replacement, avoid vaping, and clean vape device  - Tests for influenza, COVID-19, and strep will be conducted    2. Cough  - Lung exam reveals diminished breath sounds at the bases  - Advised to increase fluid intake    Return if symptoms worsen or fail to improve.    Alva COLLADO  VERENA Chavez  01/27/2025    Patient or patient representative verbalized consent for the use of Ambient Listening during the visit with  VERENA Duarte for chart documentation.

## 2025-02-04 ENCOUNTER — OFFICE VISIT (OUTPATIENT)
Dept: INTERNAL MEDICINE | Facility: CLINIC | Age: 25
End: 2025-02-04
Payer: COMMERCIAL

## 2025-02-04 VITALS
OXYGEN SATURATION: 98 % | DIASTOLIC BLOOD PRESSURE: 84 MMHG | HEART RATE: 89 BPM | SYSTOLIC BLOOD PRESSURE: 133 MMHG | RESPIRATION RATE: 16 BRPM | WEIGHT: 174 LBS | HEIGHT: 72 IN | BODY MASS INDEX: 23.57 KG/M2 | TEMPERATURE: 98.4 F

## 2025-02-04 DIAGNOSIS — Z11.3 SCREENING FOR STD (SEXUALLY TRANSMITTED DISEASE): ICD-10-CM

## 2025-02-04 DIAGNOSIS — A60.01 HERPES SIMPLEX INFECTION OF PENIS: Primary | ICD-10-CM

## 2025-02-04 PROCEDURE — 99213 OFFICE O/P EST LOW 20 MIN: CPT | Performed by: NURSE PRACTITIONER

## 2025-02-04 RX ORDER — VALACYCLOVIR HYDROCHLORIDE 1 G/1
1000 TABLET, FILM COATED ORAL DAILY
Qty: 5 TABLET | Refills: 0 | Status: SHIPPED | OUTPATIENT
Start: 2025-02-04 | End: 2025-02-09

## 2025-02-04 NOTE — PROGRESS NOTES
"  Office Visit      Patient Name: Konrad Aquino  : 2000   MRN: [id number]   Care Team: Patient Care Team:  Sofia Gong APRN as PCP - General (Nurse Practitioner)    Chief Complaint  Penis Pain (Thinks possible herpes little bit of pain and blisters. X1.5week)    Subjective     Subjective      Konrad Aquino presents to [facility] for penile lesion.   Started about 1.5 weeks ago.   Started as what resembled a \"pimple\" at the base of the penis and then more lesions showed up in clusters looking like \"water blisters\" at the shaft and head of the penis.   He has some mild pain and burning.   Does have a new sexual partner that has never had this problem and no active lesions.   He is sexually active with women.   He has never been diagnosed with HSV previously.   Did have strep throat about the time symptoms started and was given doxycycline. Those symptoms have resolved.   He would like STI screening in addition to exam today.   He has some mild dysuria but overall no other symptoms including penile discharge, hematuria, urgency, frequency.       Objective     Objective   Vital Signs:   /84   Pulse 89   Temp 98.4 °F (36.9 °C)   Resp 16   Ht 182.9 cm (72\")   Wt 78.9 kg (174 lb)   SpO2 98%   BMI 23.60 kg/m²     Physical Exam  Vitals and nursing note reviewed. Exam conducted with a chaperone present (Rosalina Harrison LPN).   Constitutional:       Appearance: Normal appearance.   Cardiovascular:      Rate and Rhythm: Normal rate and regular rhythm.      Heart sounds: Normal heart sounds. No murmur heard.  Pulmonary:      Effort: Pulmonary effort is normal.      Breath sounds: Normal breath sounds.   Genitourinary:     Penis: Lesions (clusters of vesicles without drainage, one single dry crusted lesion at base of penis) present.        Skin:     General: Skin is warm and dry.   Neurological:      Mental Status: He is alert.          Assessment / Plan  "     Assessment & Plan   Problem List Items Addressed This Visit    None  Visit Diagnoses       Herpes simplex infection of penis    -  Primary    Relevant Medications    valACYclovir (Valtrex) 1000 MG tablet    Classic presentation- discussed safe sex habits, transmission, and future implications. Start valcyclovir daily x 5 days, consider suppressive therapy if frequent outbreaks are a problem. Follow-up PRN, information provided in AVS.     Screening for STD (sexually transmitted disease)        Relevant Orders    Chlamydia trachomatis, Neisseria gonorrhoeae, Trichomonas vaginalis, PCR - Swab, Urethra                 BMI is within normal parameters. No other follow-up for BMI required.      Follow Up   Return if symptoms worsen or fail to improve.  Patient was given instructions and counseling regarding his condition or for health maintenance advice. Please see specific information pulled into the AVS if appropriate.     VERENA Chapman  [facility]

## 2025-02-08 LAB
HSV1 DNA SPEC QL NAA+PROBE: NEGATIVE
HSV2 DNA SPEC QL NAA+PROBE: POSITIVE

## 2025-02-25 ENCOUNTER — TELEPHONE (OUTPATIENT)
Dept: INTERNAL MEDICINE | Facility: CLINIC | Age: 25
End: 2025-02-25
Payer: COMMERCIAL

## 2025-02-25 DIAGNOSIS — A60.01 HERPES SIMPLEX INFECTION OF PENIS: Primary | ICD-10-CM

## 2025-02-25 DIAGNOSIS — A60.01 HERPES SIMPLEX INFECTION OF PENIS: ICD-10-CM

## 2025-02-25 RX ORDER — VALACYCLOVIR HYDROCHLORIDE 500 MG/1
500 TABLET, FILM COATED ORAL DAILY
Qty: 30 TABLET | Refills: 2 | Status: SHIPPED | OUTPATIENT
Start: 2025-02-25

## 2025-02-25 NOTE — TELEPHONE ENCOUNTER
Name: Konrad Aquino    Relationship: Self    Best Callback Number: [phone] /    HUB PROVIDED THE RELAY MESSAGE FROM THE OFFICE   PATIENT VOICED UNDERSTANDING AND HAS NO FURTHER QUESTIONS AT THIS TIME    ADDITIONAL INFORMATION:

## 2025-02-25 NOTE — TELEPHONE ENCOUNTER
Pt is wanting antiviral for herpes, he has one lesion, and it has been about 3 weeks since last outbreak, he wanted to know if he can have another round of the higher dose medication and then start a daily suppressive therapy

## 2025-02-25 NOTE — TELEPHONE ENCOUNTER
"Relay     \"LEFT PT MESSAGE TO CALL BACK   Valcyclovir 500 mg daily sent. He will need to be seen in 1 month for labs. Have to monitor liver function tests. Please get him scheduled. Thanks. \"                 "

## 2025-02-26 RX ORDER — VALACYCLOVIR HYDROCHLORIDE 1 G/1
1000 TABLET, FILM COATED ORAL DAILY
Qty: 5 TABLET | Refills: 0 | OUTPATIENT
Start: 2025-02-26 | End: 2025-03-03

## 2025-03-14 ENCOUNTER — TELEPHONE (OUTPATIENT)
Dept: INTERNAL MEDICINE | Facility: CLINIC | Age: 25
End: 2025-03-14
Payer: COMMERCIAL

## 2025-03-14 RX ORDER — VALACYCLOVIR HYDROCHLORIDE 500 MG/1
500 TABLET, FILM COATED ORAL DAILY
Qty: 30 TABLET | Refills: 2 | Status: CANCELLED | OUTPATIENT
Start: 2025-03-14

## 2025-03-14 NOTE — TELEPHONE ENCOUNTER
Caller: Zuhair Aquino    Relationship: Self    Best call back number: [phone]    Which medication are you concerned about:     valACYclovir (Valtrex) 500 MG tablet     Who prescribed you this medication: ADDI MORTON    What are your concerns: ZUHAIR SAYS THIS IS WORKING BUT HE STILL HAS FLARE-UPS.  NEXT APPT 197146    How long have you had these concerns: 2 WEEKS

## 2025-03-19 ENCOUNTER — OFFICE VISIT (OUTPATIENT)
Dept: INTERNAL MEDICINE | Facility: CLINIC | Age: 25
End: 2025-03-19
Payer: COMMERCIAL

## 2025-03-19 VITALS
HEART RATE: 74 BPM | TEMPERATURE: 97.6 F | HEIGHT: 72 IN | OXYGEN SATURATION: 100 % | WEIGHT: 172 LBS | BODY MASS INDEX: 23.3 KG/M2 | DIASTOLIC BLOOD PRESSURE: 75 MMHG | RESPIRATION RATE: 16 BRPM | SYSTOLIC BLOOD PRESSURE: 122 MMHG

## 2025-03-19 DIAGNOSIS — J35.1 TONSILLAR HYPERTROPHY: ICD-10-CM

## 2025-03-19 DIAGNOSIS — B00.9 HSV-2 (HERPES SIMPLEX VIRUS 2) INFECTION: Primary | ICD-10-CM

## 2025-03-19 DIAGNOSIS — Z11.3 SCREENING FOR STD (SEXUALLY TRANSMITTED DISEASE): ICD-10-CM

## 2025-03-19 PROCEDURE — 99214 OFFICE O/P EST MOD 30 MIN: CPT | Performed by: NURSE PRACTITIONER

## 2025-03-19 RX ORDER — VALACYCLOVIR HYDROCHLORIDE 500 MG/1
500 TABLET, FILM COATED ORAL 2 TIMES DAILY
Qty: 60 TABLET | Refills: 12 | Status: SHIPPED | OUTPATIENT
Start: 2025-03-19

## 2025-03-19 NOTE — PROGRESS NOTES
"  Office Visit      Patient Name: Konrad Aquino  : 2000   MRN: [id number]   Care Team: Patient Care Team:  Sofia Gong APRN as PCP - General (Family Medicine)    Chief Complaint  Sore Throat (X 1 month)    Subjective     Subjective      Konrad Aquino presents to [facility] for genital herpes follow-up.   Started on suppressive valtrex about 6 weeks ago, he is taking as prescribed. He has had one small outbreak that resolved spontaneously.   Typically when he gets a \"flare\" gets flu like symptoms and that's how he knows he is about to have an outbreak. Symptoms at this time have resolved.   He has always had larger tonsils and sometimes gets stones. Since his recent flare of HSV tonsils have been enlarged and girlfriend notices he snores loudly when he is on his back.   Never been told this before. He typically does not wake up feeling well rested. She has noticed some witnessed apneic episodes.   Never tested for syphyllis but urine STD/STI workup was normal.           Objective     Objective   Vital Signs:   /75   Pulse 74   Temp 97.6 °F (36.4 °C)   Resp 16   Ht 182.9 cm (72\")   Wt 78 kg (172 lb)   SpO2 100%   BMI 23.33 kg/m²    Physical Exam  Vitals and nursing note reviewed.   Constitutional:       General: He is not in acute distress.     Appearance: Normal appearance. He is not toxic-appearing.   Eyes:      Pupils: Pupils are equal, round, and reactive to light.   Neck:      Vascular: No carotid bruit.   Cardiovascular:      Rate and Rhythm: Normal rate and regular rhythm.      Heart sounds: Normal heart sounds. No murmur heard.  Pulmonary:      Effort: Pulmonary effort is normal. No respiratory distress.      Breath sounds: Normal breath sounds. No wheezing.   Abdominal:      General: Bowel sounds are normal. There is no distension.      Palpations: Abdomen is soft.      Tenderness: There is no abdominal tenderness.   Musculoskeletal:         " General: Normal range of motion.      Cervical back: Neck supple. No tenderness.   Skin:     General: Skin is warm and dry.      Findings: No rash.   Neurological:      General: No focal deficit present.      Mental Status: He is alert.   Psychiatric:         Mood and Affect: Mood normal.         Behavior: Behavior normal.        Assessment / Plan      Assessment & Plan   Problem List Items Addressed This Visit    None  Visit Diagnoses         HSV-2 (herpes simplex virus 2) infection    -  Primary    Relevant Medications    valACYclovir (Valtrex) 500 MG tablet    Other Relevant Orders    RPR    Comprehensive metabolic panel    Hepatitis Panel, Acute    HIV-1/O/2 Ag/Ab w Reflex    Check labs. Increase valacyclovir to BID dosing for suppressive therapy, if frequent flares will return.       Screening for STD (sexually transmitted disease)        Relevant Orders    RPR    Comprehensive metabolic panel    Hepatitis Panel, Acute    HIV-1/O/2 Ag/Ab w Reflex    Further lab workup as above.       Tonsillar hypertrophy        Discussed at length with him today. Recommend salt water gargles, warm liquids with honey, and follow-up with ENT to consider tonsillectomy.             BMI is within normal parameters. No other follow-up for BMI required.      Follow Up   Return in about 7 months (around 10/5/2025) for Annual physical.  Patient was given instructions and counseling regarding his condition or for health maintenance advice. Please see specific information pulled into the AVS if appropriate.     VERENA Chapman  [facility]

## 2025-03-20 LAB
ALBUMIN SERPL-MCNC: 4.5 G/DL (ref 3.5–5.2)
ALBUMIN/GLOB SERPL: 1.6 G/DL
ALP SERPL-CCNC: 112 U/L (ref 39–117)
ALT SERPL-CCNC: 35 U/L (ref 1–41)
AST SERPL-CCNC: 24 U/L (ref 1–40)
BILIRUB SERPL-MCNC: 0.9 MG/DL (ref 0–1.2)
BUN SERPL-MCNC: 15 MG/DL (ref 6–20)
BUN/CREAT SERPL: 17.9 (ref 7–25)
CALCIUM SERPL-MCNC: 9.7 MG/DL (ref 8.6–10.5)
CHLORIDE SERPL-SCNC: 103 MMOL/L (ref 98–107)
CO2 SERPL-SCNC: 28.4 MMOL/L (ref 22–29)
CREAT SERPL-MCNC: 0.84 MG/DL (ref 0.76–1.27)
EGFRCR SERPLBLD CKD-EPI 2021: 124.1 ML/MIN/1.73
GLOBULIN SER CALC-MCNC: 2.9 GM/DL
GLUCOSE SERPL-MCNC: 84 MG/DL (ref 65–99)
HAV IGM SERPL QL IA: NEGATIVE
HBV CORE IGM SERPL QL IA: NEGATIVE
HBV SURFACE AG SERPL QL IA: NEGATIVE
HCV AB SERPL QL IA: NON REACTIVE
HCV AB SERPL QL IA: NORMAL
HIV 1+2 AB+HIV1 P24 AG SERPL QL IA: NON REACTIVE
POTASSIUM SERPL-SCNC: 4.9 MMOL/L (ref 3.5–5.2)
PROT SERPL-MCNC: 7.4 G/DL (ref 6–8.5)
RPR SER QL: NON REACTIVE
SODIUM SERPL-SCNC: 143 MMOL/L (ref 136–145)

## 2025-04-14 ENCOUNTER — TELEPHONE (OUTPATIENT)
Dept: INTERNAL MEDICINE | Facility: CLINIC | Age: 25
End: 2025-04-14
Payer: COMMERCIAL

## 2025-04-14 NOTE — TELEPHONE ENCOUNTER
Caller: Konrad Aquino    Relationship: Self    Best call back number: [phone]     What orders are you requesting (i.e. lab or imaging): DRUG SCREENING FOR HIS PSYCHIATRY MEDICATION REFILLS    In what timeframe would the patient need to come in: ASAP    Where will you receive your lab/imaging services: IN OFFICE    Additional notes: PATIENT PSYCHIATRY PROVIDER NEEDS AN ANNUAL DRUG SCREENING FOR REFILL ON PATIENT. PLEASE LET PATIENT NO WHEN ORDERS ARE ACTIVE.

## 2025-04-15 NOTE — TELEPHONE ENCOUNTER
Pt is going to reach back out to his behavioral health concerning drug screen and get further information on where to complete that.

## 2025-04-28 ENCOUNTER — TRANSCRIBE ORDERS (OUTPATIENT)
Dept: LAB | Facility: HOSPITAL | Age: 25
End: 2025-04-28
Payer: COMMERCIAL

## 2025-04-28 ENCOUNTER — LAB (OUTPATIENT)
Dept: LAB | Facility: HOSPITAL | Age: 25
End: 2025-04-28
Payer: COMMERCIAL

## 2025-04-28 DIAGNOSIS — F39 MILD MOOD DISORDER: ICD-10-CM

## 2025-04-28 DIAGNOSIS — F41.1 GENERALIZED ANXIETY DISORDER: ICD-10-CM

## 2025-04-28 DIAGNOSIS — F90.0 ADHD, PREDOMINANTLY INATTENTIVE TYPE: ICD-10-CM

## 2025-04-28 DIAGNOSIS — F39 MILD MOOD DISORDER: Primary | ICD-10-CM

## 2025-04-28 LAB
AMPHET+METHAMPHET UR QL: POSITIVE
AMPHETAMINES UR QL: NEGATIVE
BARBITURATES UR QL SCN: NEGATIVE
BENZODIAZ UR QL SCN: NEGATIVE
BUPRENORPHINE SERPL-MCNC: NEGATIVE NG/ML
CANNABINOIDS SERPL QL: NEGATIVE
COCAINE UR QL: NEGATIVE
FENTANYL UR-MCNC: NEGATIVE NG/ML
METHADONE UR QL SCN: NEGATIVE
OPIATES UR QL: NEGATIVE
OXYCODONE UR QL SCN: NEGATIVE
PCP UR QL SCN: NEGATIVE
TRICYCLICS UR QL SCN: NEGATIVE

## 2025-04-28 PROCEDURE — 80307 DRUG TEST PRSMV CHEM ANLYZR: CPT

## 2025-05-08 ENCOUNTER — OFFICE VISIT (OUTPATIENT)
Dept: INTERNAL MEDICINE | Facility: CLINIC | Age: 25
End: 2025-05-08
Payer: COMMERCIAL

## 2025-05-08 VITALS
OXYGEN SATURATION: 97 % | DIASTOLIC BLOOD PRESSURE: 78 MMHG | BODY MASS INDEX: 23.03 KG/M2 | WEIGHT: 170 LBS | TEMPERATURE: 98 F | HEART RATE: 88 BPM | SYSTOLIC BLOOD PRESSURE: 120 MMHG | HEIGHT: 72 IN

## 2025-05-08 DIAGNOSIS — J06.9 UPPER RESPIRATORY TRACT INFECTION, UNSPECIFIED TYPE: ICD-10-CM

## 2025-05-08 DIAGNOSIS — J45.41 MODERATE PERSISTENT ASTHMA WITH EXACERBATION: Primary | ICD-10-CM

## 2025-05-08 DIAGNOSIS — R09.81 NASAL CONGESTION: ICD-10-CM

## 2025-05-08 LAB
EXPIRATION DATE: NORMAL
FLUAV AG UPPER RESP QL IA.RAPID: NOT DETECTED
FLUBV AG UPPER RESP QL IA.RAPID: NOT DETECTED
INTERNAL CONTROL: NORMAL
Lab: NORMAL
SARS-COV-2 AG UPPER RESP QL IA.RAPID: NOT DETECTED

## 2025-05-08 PROCEDURE — 87428 SARSCOV & INF VIR A&B AG IA: CPT | Performed by: FAMILY MEDICINE

## 2025-05-08 PROCEDURE — 99214 OFFICE O/P EST MOD 30 MIN: CPT | Performed by: FAMILY MEDICINE

## 2025-05-08 RX ORDER — CETIRIZINE HYDROCHLORIDE 10 MG/1
10 TABLET ORAL DAILY
Qty: 90 TABLET | Refills: 0 | Status: SHIPPED | OUTPATIENT
Start: 2025-05-08

## 2025-05-08 RX ORDER — DEXTROAMPHETAMINE SACCHARATE, AMPHETAMINE ASPARTATE, DEXTROAMPHETAMINE SULFATE AND AMPHETAMINE SULFATE 1.875; 1.875; 1.875; 1.875 MG/1; MG/1; MG/1; MG/1
7.5 TABLET ORAL DAILY
COMMUNITY
Start: 2025-04-10

## 2025-05-08 NOTE — PROGRESS NOTES
Konrad Aquino is a 24 y.o. male.    Chief Complaint   Patient presents with    Asthma    Fatigue    Nasal Congestion     On going for about 2 days, but has gotten some better.        HPI   History of Present Illness  The patient presents with respiratory symptoms.    He began experiencing mild cough without sputum, chest pressure, and decreased lung capacity this morning. No significant wheezing noted. Despite taking Claritin yesterday, he experienced sneezing, nasal congestion, and postnasal drainage. Prefers Zyrtec over Claritin for better symptom control. Suspects asthma exacerbation, typically experiences throat tightness but now reports chest tightness. Uncertain about availability of albuterol inhaler at home. Adverse reaction to steroids includes anxiety and tachycardia.         The following portions of the patient's history were reviewed and updated as appropriate: allergies, current medications, past family history, past medical history, past social history, past surgical history and problem list.     Allergies   Allergen Reactions    Flonase [Fluticasone] Other (See Comments)     Panic attacks    Prednisone Anxiety    Latex Rash    Penicillins Rash    Singulair [Montelukast] Dizziness         Current Outpatient Medications:     amphetamine-dextroamphetamine (ADDERALL) 7.5 MG tablet, Take 1 tablet by mouth Daily., Disp: , Rfl:     valACYclovir (Valtrex) 500 MG tablet, Take 1 tablet by mouth 2 (Two) Times a Day., Disp: 60 tablet, Rfl: 12    Albuterol-Budesonide 90-80 MCG/ACT aerosol, Inhale 2 puffs 2 (Two) Times a Day As Needed (asthma symptoms)., Disp: 10.7 g, Rfl: 0    cetirizine (zyrTEC) 10 MG tablet, Take 1 tablet by mouth Daily., Disp: 90 tablet, Rfl: 0    ROS    Review of Systems   Constitutional:  Negative for chills and fever.   HENT:  Positive for congestion, postnasal drip and sneezing.    Respiratory:  Positive for cough, chest tightness, shortness of breath and wheezing.    Cardiovascular:   "Negative for chest pain.   Gastrointestinal:  Negative for diarrhea, nausea and vomiting.       Vitals:    05/08/25 1404   BP: 120/78   Pulse: 88   Temp: 98 °F (36.7 °C)   SpO2: 97%   Weight: 77.1 kg (170 lb)   Height: 182.9 cm (72\")         Physical Exam     Physical Exam  Constitutional:       General: He is not in acute distress.     Appearance: Normal appearance. He is well-developed.   HENT:      Head: Normocephalic and atraumatic.      Right Ear: Tympanic membrane and external ear normal.      Left Ear: Tympanic membrane and external ear normal.      Mouth/Throat:      Pharynx: Posterior oropharyngeal erythema (PND) present.      Tonsils: 3+ on the right. 3+ on the left.   Eyes:      Extraocular Movements: Extraocular movements intact.      Conjunctiva/sclera: Conjunctivae normal.   Cardiovascular:      Rate and Rhythm: Normal rate and regular rhythm.      Heart sounds: No murmur heard.  Pulmonary:      Effort: Pulmonary effort is normal. No respiratory distress.      Breath sounds: Normal breath sounds. No wheezing.   Abdominal:      General: Bowel sounds are normal. There is no distension.      Palpations: Abdomen is soft.      Tenderness: There is no abdominal tenderness.   Musculoskeletal:      Right lower leg: No edema.      Left lower leg: No edema.   Skin:     General: Skin is warm and dry.   Neurological:      Mental Status: He is alert and oriented to person, place, and time.      Cranial Nerves: No cranial nerve deficit.   Psychiatric:         Mood and Affect: Mood normal.         Behavior: Behavior normal.           Diagnoses and all orders for this visit:    1. Moderate persistent asthma with exacerbation (Primary)    2. Upper respiratory tract infection, unspecified type    3. Nasal congestion  -     POCT SARS-CoV-2 Antigen [place] + Flu    Other orders  -     cetirizine (zyrTEC) 10 MG tablet; Take 1 tablet by mouth Daily.  Dispense: 90 tablet; Refill: 0  -     Albuterol-Budesonide 90-80 MCG/ACT " aerosol; Inhale 2 puffs 2 (Two) Times a Day As Needed (asthma symptoms).  Dispense: 10.7 g; Refill: 0        Assessment & Plan  1. Upper respiratory infection.  - Likely viral illness or allergies. Prescribed Zyrtec; advised generic version if OTC.    2. Asthma exacerbation.  - Prescribed Airsupra (steroid + albuterol). Advised mouth rinse post-use to prevent thrush. Prescription sent; notify office if medication is too expensive.      New Medications Ordered This Visit   Medications    cetirizine (zyrTEC) 10 MG tablet     Sig: Take 1 tablet by mouth Daily.     Dispense:  90 tablet     Refill:  0    Albuterol-Budesonide 90-80 MCG/ACT aerosol     Sig: Inhale 2 puffs 2 (Two) Times a Day As Needed (asthma symptoms).     Dispense:  10.7 g     Refill:  0       No orders of the defined types were placed in this encounter.      Return if symptoms worsen or fail to improve.    Flaquita Brian, DO

## 2025-08-14 ENCOUNTER — OFFICE VISIT (OUTPATIENT)
Dept: INTERNAL MEDICINE | Facility: CLINIC | Age: 25
End: 2025-08-14
Payer: COMMERCIAL

## 2025-08-14 VITALS
HEART RATE: 88 BPM | TEMPERATURE: 97.4 F | DIASTOLIC BLOOD PRESSURE: 72 MMHG | OXYGEN SATURATION: 99 % | WEIGHT: 164 LBS | HEIGHT: 72 IN | SYSTOLIC BLOOD PRESSURE: 110 MMHG | BODY MASS INDEX: 22.21 KG/M2 | RESPIRATION RATE: 16 BRPM

## 2025-08-14 DIAGNOSIS — K64.9 HEMORRHOIDS, UNSPECIFIED HEMORRHOID TYPE: ICD-10-CM

## 2025-08-14 DIAGNOSIS — R19.8 PAIN WITH BOWEL MOVEMENTS: ICD-10-CM

## 2025-08-14 DIAGNOSIS — K62.89 RECTAL PAIN: Primary | ICD-10-CM

## 2025-08-14 DIAGNOSIS — B07.0 PLANTAR WART, LEFT FOOT: ICD-10-CM

## 2025-08-14 PROCEDURE — 99213 OFFICE O/P EST LOW 20 MIN: CPT | Performed by: NURSE PRACTITIONER

## 2025-08-14 RX ORDER — HYDROCORTISONE ACETATE 25 MG/1
25 SUPPOSITORY RECTAL 2 TIMES DAILY
Qty: 14 SUPPOSITORY | Refills: 0 | Status: SHIPPED | OUTPATIENT
Start: 2025-08-14 | End: 2025-08-21

## 2025-08-20 ENCOUNTER — OFFICE VISIT (OUTPATIENT)
Dept: FAMILY MEDICINE CLINIC | Facility: OTHER | Age: 25
End: 2025-08-20
Payer: COMMERCIAL

## 2025-08-20 VITALS
BODY MASS INDEX: 21.94 KG/M2 | SYSTOLIC BLOOD PRESSURE: 118 MMHG | DIASTOLIC BLOOD PRESSURE: 78 MMHG | HEART RATE: 104 BPM | TEMPERATURE: 97.3 F | WEIGHT: 162 LBS | OXYGEN SATURATION: 97 % | HEIGHT: 72 IN

## 2025-08-20 DIAGNOSIS — U07.1 COVID-19 VIRUS INFECTION: Primary | ICD-10-CM

## 2025-08-20 LAB
EXPIRATION DATE: ABNORMAL
EXPIRATION DATE: NORMAL
FLUAV AG UPPER RESP QL IA.RAPID: NOT DETECTED
FLUBV AG UPPER RESP QL IA.RAPID: NOT DETECTED
INTERNAL CONTROL: ABNORMAL
INTERNAL CONTROL: NORMAL
Lab: ABNORMAL
Lab: NORMAL
S PYO AG THROAT QL: NEGATIVE
SARS-COV-2 AG UPPER RESP QL IA.RAPID: DETECTED

## 2025-08-20 PROCEDURE — 99213 OFFICE O/P EST LOW 20 MIN: CPT | Performed by: PHYSICIAN ASSISTANT

## 2025-08-20 PROCEDURE — 87428 SARSCOV & INF VIR A&B AG IA: CPT | Performed by: PHYSICIAN ASSISTANT

## 2025-08-20 RX ORDER — BROMPHENIRAMINE MALEATE, PSEUDOEPHEDRINE HYDROCHLORIDE, AND DEXTROMETHORPHAN HYDROBROMIDE 2; 30; 10 MG/5ML; MG/5ML; MG/5ML
10 SYRUP ORAL 4 TIMES DAILY PRN
Qty: 180 ML | Refills: 0 | Status: SHIPPED | OUTPATIENT
Start: 2025-08-20